# Patient Record
Sex: FEMALE | Race: WHITE
[De-identification: names, ages, dates, MRNs, and addresses within clinical notes are randomized per-mention and may not be internally consistent; named-entity substitution may affect disease eponyms.]

---

## 2017-04-04 ENCOUNTER — APPOINTMENT (OUTPATIENT)
Dept: OBGYN | Facility: CLINIC | Age: 37
End: 2017-04-04

## 2017-04-04 VITALS
DIASTOLIC BLOOD PRESSURE: 60 MMHG | WEIGHT: 134 LBS | SYSTOLIC BLOOD PRESSURE: 89 MMHG | HEIGHT: 66 IN | BODY MASS INDEX: 21.53 KG/M2

## 2017-04-04 DIAGNOSIS — Z82.3 FAMILY HISTORY OF STROKE: ICD-10-CM

## 2017-04-04 DIAGNOSIS — Z82.49 FAMILY HISTORY OF ISCHEMIC HEART DISEASE AND OTHER DISEASES OF THE CIRCULATORY SYSTEM: ICD-10-CM

## 2017-04-04 DIAGNOSIS — Z78.9 OTHER SPECIFIED HEALTH STATUS: ICD-10-CM

## 2017-04-10 LAB
CYTOLOGY CVX/VAG DOC THIN PREP: NORMAL
HPV HIGH+LOW RISK DNA PNL CVX: NEGATIVE

## 2017-04-13 ENCOUNTER — FORM ENCOUNTER (OUTPATIENT)
Age: 37
End: 2017-04-13

## 2017-04-14 ENCOUNTER — APPOINTMENT (OUTPATIENT)
Dept: MAMMOGRAPHY | Facility: CLINIC | Age: 37
End: 2017-04-14

## 2017-04-14 ENCOUNTER — APPOINTMENT (OUTPATIENT)
Dept: ULTRASOUND IMAGING | Facility: CLINIC | Age: 37
End: 2017-04-14

## 2017-04-14 ENCOUNTER — OUTPATIENT (OUTPATIENT)
Dept: OUTPATIENT SERVICES | Facility: HOSPITAL | Age: 37
LOS: 1 days | End: 2017-04-14
Payer: COMMERCIAL

## 2017-04-14 DIAGNOSIS — Z15.01 GENETIC SUSCEPTIBILITY TO MALIGNANT NEOPLASM OF BREAST: ICD-10-CM

## 2017-04-14 DIAGNOSIS — Z00.8 ENCOUNTER FOR OTHER GENERAL EXAMINATION: ICD-10-CM

## 2017-04-14 PROCEDURE — 76856 US EXAM PELVIC COMPLETE: CPT

## 2017-04-14 PROCEDURE — 77063 BREAST TOMOSYNTHESIS BI: CPT

## 2017-04-14 PROCEDURE — 77067 SCR MAMMO BI INCL CAD: CPT

## 2017-04-14 PROCEDURE — 76830 TRANSVAGINAL US NON-OB: CPT

## 2017-10-24 ENCOUNTER — APPOINTMENT (OUTPATIENT)
Dept: OBGYN | Facility: CLINIC | Age: 37
End: 2017-10-24
Payer: COMMERCIAL

## 2017-10-24 VITALS
BODY MASS INDEX: 21.38 KG/M2 | DIASTOLIC BLOOD PRESSURE: 60 MMHG | SYSTOLIC BLOOD PRESSURE: 100 MMHG | WEIGHT: 133 LBS | HEIGHT: 66 IN

## 2017-10-24 PROCEDURE — 99214 OFFICE O/P EST MOD 30 MIN: CPT

## 2017-10-27 LAB
BACTERIA GENITAL AEROBE CULT: ABNORMAL
C TRACH RRNA SPEC QL NAA+PROBE: NOT DETECTED
N GONORRHOEA RRNA SPEC QL NAA+PROBE: NOT DETECTED
SOURCE AMPLIFICATION: NORMAL

## 2017-12-04 ENCOUNTER — APPOINTMENT (OUTPATIENT)
Dept: OBGYN | Facility: CLINIC | Age: 37
End: 2017-12-04
Payer: COMMERCIAL

## 2017-12-04 VITALS
DIASTOLIC BLOOD PRESSURE: 68 MMHG | SYSTOLIC BLOOD PRESSURE: 105 MMHG | BODY MASS INDEX: 21.38 KG/M2 | HEIGHT: 66 IN | WEIGHT: 133 LBS

## 2017-12-04 DIAGNOSIS — N76.0 ACUTE VAGINITIS: ICD-10-CM

## 2017-12-04 PROCEDURE — 99213 OFFICE O/P EST LOW 20 MIN: CPT

## 2017-12-13 ENCOUNTER — APPOINTMENT (OUTPATIENT)
Dept: MATERNAL FETAL MEDICINE | Facility: CLINIC | Age: 37
End: 2017-12-13

## 2017-12-14 ENCOUNTER — APPOINTMENT (OUTPATIENT)
Dept: MATERNAL FETAL MEDICINE | Facility: CLINIC | Age: 37
End: 2017-12-14

## 2018-02-22 ENCOUNTER — APPOINTMENT (OUTPATIENT)
Dept: MATERNAL FETAL MEDICINE | Facility: CLINIC | Age: 38
End: 2018-02-22
Payer: COMMERCIAL

## 2018-02-22 PROCEDURE — 99245 OFF/OP CONSLTJ NEW/EST HI 55: CPT

## 2018-04-09 ENCOUNTER — RESULT REVIEW (OUTPATIENT)
Age: 38
End: 2018-04-09

## 2018-04-16 ENCOUNTER — APPOINTMENT (OUTPATIENT)
Dept: OBGYN | Facility: CLINIC | Age: 38
End: 2018-04-16
Payer: COMMERCIAL

## 2018-04-16 VITALS
HEIGHT: 66 IN | DIASTOLIC BLOOD PRESSURE: 64 MMHG | WEIGHT: 133 LBS | SYSTOLIC BLOOD PRESSURE: 110 MMHG | BODY MASS INDEX: 21.38 KG/M2

## 2018-04-16 PROCEDURE — 99395 PREV VISIT EST AGE 18-39: CPT

## 2018-04-16 PROCEDURE — 99213 OFFICE O/P EST LOW 20 MIN: CPT | Mod: 25

## 2018-04-18 LAB
C TRACH RRNA SPEC QL NAA+PROBE: NOT DETECTED
HPV HIGH+LOW RISK DNA PNL CVX: NOT DETECTED
N GONORRHOEA RRNA SPEC QL NAA+PROBE: NOT DETECTED
SOURCE TP AMPLIFICATION: NORMAL

## 2018-04-23 LAB — CYTOLOGY CVX/VAG DOC THIN PREP: NORMAL

## 2018-10-24 ENCOUNTER — RESULT CHARGE (OUTPATIENT)
Age: 38
End: 2018-10-24

## 2018-10-24 ENCOUNTER — APPOINTMENT (OUTPATIENT)
Dept: OBGYN | Facility: CLINIC | Age: 38
End: 2018-10-24
Payer: COMMERCIAL

## 2018-10-24 ENCOUNTER — ASOB RESULT (OUTPATIENT)
Age: 38
End: 2018-10-24

## 2018-10-24 VITALS
DIASTOLIC BLOOD PRESSURE: 75 MMHG | HEART RATE: 81 BPM | BODY MASS INDEX: 21.53 KG/M2 | HEIGHT: 66 IN | WEIGHT: 134 LBS | SYSTOLIC BLOOD PRESSURE: 117 MMHG

## 2018-10-24 DIAGNOSIS — N92.0 EXCESSIVE AND FREQUENT MENSTRUATION WITH REGULAR CYCLE: ICD-10-CM

## 2018-10-24 DIAGNOSIS — Z80.41 FAMILY HISTORY OF MALIGNANT NEOPLASM OF OVARY: ICD-10-CM

## 2018-10-24 DIAGNOSIS — Z80.3 FAMILY HISTORY OF MALIGNANT NEOPLASM OF BREAST: ICD-10-CM

## 2018-10-24 LAB
HCG UR QL: NEGATIVE
QUALITY CONTROL: YES

## 2018-10-24 PROCEDURE — 81025 URINE PREGNANCY TEST: CPT

## 2018-10-24 PROCEDURE — 76830 TRANSVAGINAL US NON-OB: CPT

## 2018-10-24 PROCEDURE — 76857 US EXAM PELVIC LIMITED: CPT

## 2018-10-24 PROCEDURE — 58558Z: CUSTOM

## 2018-10-27 LAB — CORE LAB BIOPSY: NORMAL

## 2018-11-13 LAB
BASOPHILS # BLD AUTO: 0.04 K/UL
BASOPHILS NFR BLD AUTO: 0.5 %
CANCER AG125 SERPL-ACNC: 23 U/ML
EOSINOPHIL # BLD AUTO: 0.07 K/UL
EOSINOPHIL NFR BLD AUTO: 0.8 %
HCT VFR BLD CALC: 42.8 %
HGB BLD-MCNC: 15.1 G/DL
IMM GRANULOCYTES NFR BLD AUTO: 0.4 %
LYMPHOCYTES # BLD AUTO: 2.45 K/UL
LYMPHOCYTES NFR BLD AUTO: 29.7 %
MAN DIFF?: NORMAL
MCHC RBC-ENTMCNC: 32.9 PG
MCHC RBC-ENTMCNC: 35.3 GM/DL
MCV RBC AUTO: 93.2 FL
MONOCYTES # BLD AUTO: 0.48 K/UL
MONOCYTES NFR BLD AUTO: 5.8 %
NEUTROPHILS # BLD AUTO: 5.18 K/UL
NEUTROPHILS NFR BLD AUTO: 62.8 %
PLATELET # BLD AUTO: 314 K/UL
RBC # BLD: 4.59 M/UL
RBC # FLD: 12.7 %
WBC # FLD AUTO: 8.25 K/UL

## 2019-04-29 ENCOUNTER — APPOINTMENT (OUTPATIENT)
Dept: OBGYN | Facility: CLINIC | Age: 39
End: 2019-04-29
Payer: COMMERCIAL

## 2019-04-29 VITALS
BODY MASS INDEX: 21.53 KG/M2 | HEART RATE: 62 BPM | HEIGHT: 66 IN | SYSTOLIC BLOOD PRESSURE: 115 MMHG | WEIGHT: 134 LBS | DIASTOLIC BLOOD PRESSURE: 77 MMHG

## 2019-04-29 DIAGNOSIS — Z01.419 ENCOUNTER FOR GYNECOLOGICAL EXAMINATION (GENERAL) (ROUTINE) W/OUT ABNORMAL FINDINGS: ICD-10-CM

## 2019-04-29 PROCEDURE — 99395 PREV VISIT EST AGE 18-39: CPT

## 2019-04-29 PROCEDURE — 81003 URINALYSIS AUTO W/O SCOPE: CPT | Mod: QW

## 2019-04-29 NOTE — PHYSICAL EXAM
[Awake] : awake [Alert] : alert [Acute Distress] : no acute distress [LAD] : no lymphadenopathy [Thyroid Nodule] : no thyroid nodule [Goiter] : no goiter [Mass] : no breast mass [Nipple Discharge] : no nipple discharge [Axillary LAD] : no axillary lymphadenopathy [Soft] : soft [Tender] : non tender [Distended] : not distended [H/Smegaly] : no hepatosplenomegaly [Oriented x3] : oriented to person, place, and time [Normal] : uterus [No Bleeding] : there was no active vaginal bleeding [Uterine Adnexae] : were not tender and not enlarged

## 2019-05-01 LAB
C TRACH RRNA SPEC QL NAA+PROBE: NOT DETECTED
N GONORRHOEA RRNA SPEC QL NAA+PROBE: NOT DETECTED
SOURCE AMPLIFICATION: NORMAL

## 2019-05-02 LAB
CYTOLOGY CVX/VAG DOC THIN PREP: NORMAL
HPV HIGH+LOW RISK DNA PNL CVX: DETECTED

## 2019-05-28 ENCOUNTER — APPOINTMENT (OUTPATIENT)
Dept: OBGYN | Facility: CLINIC | Age: 39
End: 2019-05-28
Payer: COMMERCIAL

## 2019-05-28 ENCOUNTER — ASOB RESULT (OUTPATIENT)
Age: 39
End: 2019-05-28

## 2019-05-28 VITALS
BODY MASS INDEX: 21.53 KG/M2 | SYSTOLIC BLOOD PRESSURE: 120 MMHG | DIASTOLIC BLOOD PRESSURE: 60 MMHG | HEIGHT: 66 IN | WEIGHT: 134 LBS

## 2019-05-28 DIAGNOSIS — B97.7 PAPILLOMAVIRUS AS THE CAUSE OF DISEASES CLASSIFIED ELSEWHERE: ICD-10-CM

## 2019-05-28 DIAGNOSIS — N63.0 UNSPECIFIED LUMP IN UNSPECIFIED BREAST: ICD-10-CM

## 2019-05-28 DIAGNOSIS — N64.4 MASTODYNIA: ICD-10-CM

## 2019-05-28 PROCEDURE — 76857 US EXAM PELVIC LIMITED: CPT

## 2019-05-28 PROCEDURE — 99214 OFFICE O/P EST MOD 30 MIN: CPT

## 2019-05-28 PROCEDURE — 76830 TRANSVAGINAL US NON-OB: CPT

## 2019-08-28 ENCOUNTER — LABORATORY RESULT (OUTPATIENT)
Age: 39
End: 2019-08-28

## 2019-10-25 ENCOUNTER — APPOINTMENT (OUTPATIENT)
Dept: SURGERY | Facility: CLINIC | Age: 39
End: 2019-10-25
Payer: COMMERCIAL

## 2019-10-25 VITALS
HEIGHT: 66 IN | SYSTOLIC BLOOD PRESSURE: 120 MMHG | BODY MASS INDEX: 21.86 KG/M2 | DIASTOLIC BLOOD PRESSURE: 80 MMHG | WEIGHT: 136 LBS

## 2019-10-25 DIAGNOSIS — Z86.000 PERSONAL HISTORY OF IN-SITU NEOPLASM OF BREAST: ICD-10-CM

## 2019-10-25 DIAGNOSIS — R92.1 MAMMOGRAPHIC CALCIFICATION FOUND ON DIAGNOSTIC IMAGING OF BREAST: ICD-10-CM

## 2019-10-25 PROCEDURE — 99205 OFFICE O/P NEW HI 60 MIN: CPT

## 2019-10-28 ENCOUNTER — APPOINTMENT (OUTPATIENT)
Dept: PLASTIC SURGERY | Facility: CLINIC | Age: 39
End: 2019-10-28
Payer: COMMERCIAL

## 2019-10-28 VITALS — WEIGHT: 136 LBS | BODY MASS INDEX: 21.86 KG/M2 | HEIGHT: 66 IN

## 2019-10-28 DIAGNOSIS — Z63.5 DISRUPTION OF FAMILY BY SEPARATION AND DIVORCE: ICD-10-CM

## 2019-10-28 DIAGNOSIS — G47.419 NARCOLEPSY W/OUT CATAPLEXY: ICD-10-CM

## 2019-10-28 PROCEDURE — 99205 OFFICE O/P NEW HI 60 MIN: CPT

## 2019-10-28 SDOH — SOCIAL STABILITY - SOCIAL INSECURITY: DISRUPTION OF FAMILY BY SEPARATION AND DIVORCE: Z63.5

## 2019-10-31 PROBLEM — Z63.5 SEPARATED FROM SPOUSE: Status: ACTIVE | Noted: 2019-10-31

## 2019-10-31 PROBLEM — G47.419 NARCOLEPSY: Status: RESOLVED | Noted: 2019-10-31 | Resolved: 2019-10-31

## 2019-10-31 NOTE — HISTORY OF PRESENT ILLNESS
[FreeTextEntry1] : 40 yo female presents for a breast reconstruction consultation.  The patient is BRCA2 positive and has been undergoing breast imaging for surveillance every 6 months.  She recently underwent imaging and then biopsy of her left breast revealing DCIS.  \par \par The patient has history of breast augmentation in 2016, shes believes the implants are subpectoral, placed via an IMF approach, but is unsure of other implant details.  \par \par She will be undergoing bilateral mastectomy, possibly nipple-sparing, and would like to discuss breast reconstruction options. She would like to be the same size or painting. \par \par The patient states she was recently diagnosed with narcolepsy and takes armodafinil.  She has additional surgical history of , abdominoplasty, and rhinoplasty.  She has three children, ages 7, 5 and 5.  The patient does not smoke.  She is .  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

## 2019-10-31 NOTE — PHYSICAL EXAM
[Bra Size: _______] : Bra Size: [unfilled] [NI] : Normal [de-identified] : sternal notch to nipple on the left is 22.5 cm and 21 cm on the right, the nipple to IMF is 10.5 cm on the left and 9.5 cm on the right, the base width is 11 cm, there is grade 1 ptosis.  Bilateral 4 cm IMF scars.  Left NAC is lower than the right.  the patient has breast asymmetry. there is no open wound, erythema, or mass [de-identified] : Abdominoplasty scar [de-identified] : inadequate adiposity or skin laxity for the breast reconstruction volume the patient desires

## 2019-11-03 ENCOUNTER — FORM ENCOUNTER (OUTPATIENT)
Age: 39
End: 2019-11-03

## 2019-11-04 ENCOUNTER — APPOINTMENT (OUTPATIENT)
Dept: MRI IMAGING | Facility: CLINIC | Age: 39
End: 2019-11-04
Payer: COMMERCIAL

## 2019-11-04 ENCOUNTER — OUTPATIENT (OUTPATIENT)
Dept: OUTPATIENT SERVICES | Facility: HOSPITAL | Age: 39
LOS: 1 days | End: 2019-11-04
Payer: COMMERCIAL

## 2019-11-04 DIAGNOSIS — D05.12 INTRADUCTAL CARCINOMA IN SITU OF LEFT BREAST: ICD-10-CM

## 2019-11-04 PROCEDURE — C8937: CPT

## 2019-11-04 PROCEDURE — C8908: CPT

## 2019-11-04 PROCEDURE — 77049 MRI BREAST C-+ W/CAD BI: CPT | Mod: 26

## 2019-11-04 PROCEDURE — A9585: CPT

## 2019-11-06 ENCOUNTER — APPOINTMENT (OUTPATIENT)
Dept: BREAST CENTER | Facility: CLINIC | Age: 39
End: 2019-11-06
Payer: COMMERCIAL

## 2019-11-06 VITALS
WEIGHT: 135 LBS | HEART RATE: 59 BPM | HEIGHT: 66 IN | SYSTOLIC BLOOD PRESSURE: 105 MMHG | DIASTOLIC BLOOD PRESSURE: 63 MMHG | BODY MASS INDEX: 21.69 KG/M2

## 2019-11-06 DIAGNOSIS — Z13.79 ENCOUNTER FOR OTHER SCREENING FOR GENETIC AND CHROMOSOMAL ANOMALIES: ICD-10-CM

## 2019-11-06 DIAGNOSIS — Z80.0 FAMILY HISTORY OF MALIGNANT NEOPLASM OF DIGESTIVE ORGANS: ICD-10-CM

## 2019-11-06 DIAGNOSIS — Z82.49 FAMILY HISTORY OF ISCHEMIC HEART DISEASE AND OTHER DISEASES OF THE CIRCULATORY SYSTEM: ICD-10-CM

## 2019-11-06 DIAGNOSIS — Z84.81 FAMILY HISTORY OF CARRIER OF GENETIC DISEASE: ICD-10-CM

## 2019-11-06 DIAGNOSIS — Z80.3 FAMILY HISTORY OF MALIGNANT NEOPLASM OF BREAST: ICD-10-CM

## 2019-11-06 PROCEDURE — 99245 OFF/OP CONSLTJ NEW/EST HI 55: CPT

## 2019-11-06 RX ORDER — NORETHINDRONE ACETATE AND ETHINYL ESTRADIOL TABLETS AND FERROUS FUMARATE TABLETS 1MG-20(24)
1-20 KIT ORAL
Qty: 90 | Refills: 3 | Status: DISCONTINUED | COMMUNITY
Start: 2017-10-24 | End: 2019-11-06

## 2019-11-06 RX ORDER — AMOXICILLIN 500 MG/1
500 TABLET, FILM COATED ORAL 3 TIMES DAILY
Qty: 21 | Refills: 0 | Status: DISCONTINUED | COMMUNITY
Start: 2017-10-27 | End: 2019-11-06

## 2019-11-06 RX ORDER — MISOPROSTOL 200 UG/1
200 TABLET ORAL
Qty: 2 | Refills: 0 | Status: DISCONTINUED | COMMUNITY
Start: 2018-04-16 | End: 2019-11-06

## 2019-11-06 RX ORDER — CLOTRIMAZOLE AND BETAMETHASONE DIPROPIONATE 10; .5 MG/G; MG/G
1-0.05 CREAM TOPICAL TWICE DAILY
Qty: 1 | Refills: 2 | Status: DISCONTINUED | COMMUNITY
Start: 2017-12-04 | End: 2019-11-06

## 2019-11-06 RX ORDER — FLUCONAZOLE 150 MG/1
150 TABLET ORAL
Qty: 1 | Refills: 2 | Status: DISCONTINUED | COMMUNITY
Start: 2017-10-27 | End: 2019-11-06

## 2019-11-06 NOTE — DATA REVIEWED
[FreeTextEntry1] : Bilateral mammogram 7/2/2019:  Bilateral intact implants.  Small grouping of fine microcalcifications upper outer left breast.  Additional views advised.\par \par Bilateral breast ultrasound 7/30/2019:  Negative.\par \par Left mammogram 7/30/2019:  Grouping of pleomorphic microcalcifications upper outer left breast.  Consider biopsy.\par \par Left stereotactic biopsy 10/10/2019:  Ductal carcinoma in situ, solid with comedonecrosis and calcifications, intermediate grade, ER %  HI 71-80%\par \par Bilateral breast MRI 11/4/2019:  Biopsy marker left UOQ with minimal NME.  Intact implants.  Left duct ectasia with internal debris/no enhancement.\par \par (Images were reviewed on PACS.)\par

## 2019-11-06 NOTE — REVIEW OF SYSTEMS
[Negative] : Endocrine [de-identified] : had evaluation by Dr. Chang in 2018 for family clotting history, hypercoagulable work-up negative

## 2019-11-06 NOTE — PHYSICAL EXAM
[EOMI] : extra ocular movement intact [Sclera nonicteric] : sclera nonicteric [Supple] : supple [No Supraclavicular Adenopathy] : no supraclavicular adenopathy [No Cervical Adenopathy] : no cervical adenopathy [No Thyromegaly] : no thyromegaly [Clear to Auscultation Bilat] : clear to auscultation bilaterally [Normal Sinus Rhythm] : normal sinus rhythm [Normal S1, S2] : normal S1 and S2 [Examined in the supine and seated position] : examined in the supine and seated position [Symmetrical] : symmetrical [Bra Size: ___] : Bra Size: [unfilled] [No dominant masses] : no dominant masses in right breast  [No dominant masses] : no dominant masses left breast [No Nipple Retraction] : no left nipple retraction [No Nipple Discharge] : no left nipple discharge [No Axillary Lymphadenopathy] : no left axillary lymphadenopathy [Soft] : abdomen soft [Not Tender] : non-tender [No Palpable Masses] : no abdominal mass palpated [de-identified] : Inframammary incision. [de-identified] : Inframammary incision.

## 2019-11-06 NOTE — HISTORY OF PRESENT ILLNESS
[FreeTextEntry1] : This is a 39 year old  female who has a known BRCA2 mutation.  She was tested (single site test and ?later additional test with Dr. Tate) after her father was diagnosed with pancreatic cancer and found to carry the mutation.\par \par She has been having breast screening and her recent mammogram showed new calcifications in the left breast.  A stereotactic biopsy shows DCIS. She has silicone breast implants that were placed by Dr. Cortez in 2016.  She does BSE and thought she felt something in the outer areas of both breasts before going for the mammogram. She is her with her paternal aunt.\par \par She has already met with Dr. Esquivel and Dr. Aranda and is here for another opinion. Additional genetic testing was sent by Dr. Esquivel.

## 2019-11-06 NOTE — PAST MEDICAL HISTORY
[Menarche Age ____] : age at menarche was [unfilled] [Total Preg ___] : G[unfilled] [Live Births ___] : P[unfilled]  [AB Spont ___] : miscarriages: [unfilled]  [Multiple Births ___] :  multiple birth pregnancies: [unfilled] [FreeTextEntry2] : 3 boys [FreeTextEntry7] : ages 16-18 [FreeTextEntry8] : 1 year each

## 2019-11-08 ENCOUNTER — RESULT REVIEW (OUTPATIENT)
Age: 39
End: 2019-11-08

## 2019-11-11 ENCOUNTER — APPOINTMENT (OUTPATIENT)
Dept: SURGERY | Facility: CLINIC | Age: 39
End: 2019-11-11

## 2019-11-11 ENCOUNTER — APPOINTMENT (OUTPATIENT)
Dept: SURGERY | Facility: CLINIC | Age: 39
End: 2019-11-11
Payer: COMMERCIAL

## 2019-11-17 PROBLEM — Z86.000 HISTORY OF DUCTAL CARCINOMA IN SITU (DCIS) OF LEFT BREAST: Status: RESOLVED | Noted: 2019-10-25 | Resolved: 2019-11-17

## 2019-11-17 NOTE — PHYSICAL EXAM
[Normocephalic] : normocephalic [EOMI] : extra ocular movement intact [Atraumatic] : atraumatic [Sclera nonicteric] : sclera nonicteric [PERRL] : pupils equal, round and reactive to light [Supple] : supple [No Supraclavicular Adenopathy] : no supraclavicular adenopathy [Examined in the supine and seated position] : examined in the supine and seated position [No dominant masses] : no dominant masses in right breast  [No dominant masses] : no dominant masses left breast [No Nipple Retraction] : no left nipple retraction [No Nipple Discharge] : no left nipple discharge [No Axillary Lymphadenopathy] : no right axillary lymphadenopathy [No Rashes] : no rashes [No Ulceration] : no ulceration [No Edema] : no edema [Breast Nipple Inversion] : nipples not inverted [Breast Nipple Flattening] : nipples not flattened [Breast Nipple Retraction] : nipples not retracted [Breast Nipple Fissures] : nipples not fissured [Breast Abnormal Secretion Bloody Fluid] : no bloody discharge [Breast Abnormal Lactation (Galactorrhea)] : no galactorrhea [Breast Abnormal Secretion Opalescent Fluid] : no milky discharge [de-identified] : No supraclavicular or axillary adenopathy. No dominant masses, normal to palpation. Everted nipple without discharge.  [Breast Abnormal Secretion Serous Fluid] : no serous discharge [de-identified] : No supraclavicular or axillary adenopathy. No dominant masses, normal to palpation. Everted nipple without discharge. (upper outer quadrant biopsy site)

## 2019-11-17 NOTE — HISTORY OF PRESENT ILLNESS
[FreeTextEntry1] : I had the pleasure of seeing Mackenzie Qiu in the office for a new visit breast evaluation secondary to newly diagnosed left breast DCIS. \par \par Macknezie is a darrick 38 yo premenopausal female who carries a BRCA 2 genetic mutation and has been under high risk surveillance with alternating MRI and mammogram q 6 months.  She was noted to have an area of pleomorphic microcalcifications on left mammogram 7/2019.  Stereotactic core biopsy demonstrated DCIS grade 2 with comedonecrosis ER % NE 90% \par \par We reviewed imaging and pathogenesis of disease.  She understands that DCIS is stage 0 breast cancer and that the treatment options include hormonal therapy, radiation therapy with breast conservation v mastectomy without hormonal therapy or radiation therapy.  She understands that secondary to history of BRCA 2 carrier, bilateral risk reduction mastectomy is recommended in addition to bilateral salpingo-oophorectomy.  She understands that recent data suggests bilateral salpingo-oophorectomy can be performed in several years, however she will discuss this at length with Dr. Lee Malagon who she was referred to.  In addition, we discussed fertility preservation, however she states that she is done with family planning.\par \par We discussed risks v benefits v technical aspects of surgery.  We discussed the options of skin sparing, nipple sparing v simple mastectomy and the technical variations with increased risk for skin ischemia/tissue loss with nipple sparing mastectomy.   We thoroughly discussed sentinel lymph node biopsy, the rate of lymphedema of 7-10% and the need for blue dye and nuclear medicine radiotracer.  She understands that data does not support performing sentinel lymph node biopsy on the contralateral side, however we discussed risks v benefits and the option of performing it at the time of risk reduction right mastectomy in the event of occult malignancy, axillary dissection would be avoided. She understands that the rate of lymphedema with axillary dissection can be as high as 20-25%.  We also discussed the option of breast conservation however secondary to genetic mutation in BRCA 2 she understands that she has an elevated risk of developing a secondary breast cancer and standard recommendation is for bilateral risk reduction mastectomy.\par \par We also discussed the various cancers associated with BRCA 2 and elevated risk including melanoma and pancreatic cancer.  We will coordinate a consultation with a dermatologist after surgery for annual screening.\par \par All questions were answered.\par A consultation with Plastic Surgery will be coordinated.\par \par

## 2019-11-17 NOTE — ASSESSMENT
[FreeTextEntry1] : 40 yo premenopausal female with BRCA 2 genetic mutation presents with biopsy proven hormone positive ductal carcinoma in situ; hormone positive.  She is electing to undergo bilateral mastectomy with left sentinel lymph node biopsy. \par 1. MRI of the breast\par 2. Repeat or request genetic testing\par 3. Plan for bilateral nipple sparing mastectomy\par 4. Consult with Plastic Surgery\par 5. Appt with Dr. Redd for fertility options\par 6. Appt with Dr. Lee Malagon

## 2019-11-19 ENCOUNTER — APPOINTMENT (OUTPATIENT)
Dept: SURGERY | Facility: CLINIC | Age: 39
End: 2019-11-19
Payer: COMMERCIAL

## 2019-11-19 DIAGNOSIS — Z01.818 ENCOUNTER FOR OTHER PREPROCEDURAL EXAMINATION: ICD-10-CM

## 2019-11-19 PROCEDURE — 99215 OFFICE O/P EST HI 40 MIN: CPT

## 2019-11-20 ENCOUNTER — APPOINTMENT (OUTPATIENT)
Dept: GYNECOLOGIC ONCOLOGY | Facility: CLINIC | Age: 39
End: 2019-11-20
Payer: COMMERCIAL

## 2019-11-20 DIAGNOSIS — O24.419 GESTATIONAL DIABETES MELLITUS IN PREGNANCY, UNSPECIFIED CONTROL: ICD-10-CM

## 2019-11-20 DIAGNOSIS — Z91.89 OTHER SPECIFIED PERSONAL RISK FACTORS, NOT ELSEWHERE CLASSIFIED: ICD-10-CM

## 2019-11-20 PROBLEM — Z01.818 PREOPERATIVE EXAMINATION: Status: ACTIVE | Noted: 2019-11-20

## 2019-11-20 PROCEDURE — 99245 OFF/OP CONSLTJ NEW/EST HI 55: CPT

## 2019-11-20 NOTE — OB HISTORY
[Total Preg ___] : : [unfilled] [Full Term ___] : [unfilled] (full-term) [Living ___] : [unfilled] (living) [Vaginal ___] : [unfilled] vaginal delivery(s) [ ___] : [unfilled]  section delivery(s)

## 2019-11-20 NOTE — HISTORY OF PRESENT ILLNESS
[FreeTextEntry1] : I had the pleasure of seeing Mackenzie Qiu in the office for follow up breast evaluation secondary to newly diagnosed left breast DCIS. \par \par Mackenzie is a darrick 38 yo premenopausal female who carries a BRCA 2 genetic mutation and has been under high risk surveillance with alternating MRI and mammogram q 6 months. She was noted to have an area of pleomorphic microcalcifications on left mammogram 7/2019. Stereotactic core biopsy demonstrated DCIS grade 2 with comedonecrosis ER % HI 90% \par \par We reviewed imaging and pathogenesis of disease. She understands that DCIS is stage 0 breast cancer and that the treatment options include hormonal therapy, radiation therapy with breast conservation v mastectomy without hormonal therapy or radiation therapy. She understands that secondary to history of BRCA 2 carrier, bilateral risk reduction mastectomy is recommended in addition to bilateral salpingo-oophorectomy. She understands that recent data suggests bilateral salpingo-oophorectomy can be performed in several years, however she will discuss this at length with Dr. Lee Malagon who she was referred to. In addition, we discussed fertility preservation, however she states that she is done with family planning.\par \par We discussed risks v benefits v technical aspects of surgery. We discussed the options of skin sparing, nipple sparing v simple mastectomy and the technical variations with increased risk for skin ischemia/tissue loss with nipple sparing mastectomy. We thoroughly discussed sentinel lymph node biopsy, the rate of lymphedema of 7-10% and the need for blue dye and nuclear medicine radiotracer. She understands that data does not support performing sentinel lymph node biopsy on the contralateral side, however we discussed risks v benefits and the option of performing it at the time of risk reduction right mastectomy in the event of occult malignancy, axillary dissection would be avoided. She understands that the rate of lymphedema with axillary dissection can be as high as 20-25%. We also discussed the option of breast conservation however secondary to genetic mutation in BRCA 2 she understands that she has an elevated risk of developing a secondary breast cancer and standard recommendation is for bilateral risk reduction mastectomy.\par \par We also discussed the various cancers associated with BRCA 2 and elevated risk including melanoma and pancreatic cancer. We will coordinate a consultation with a dermatologist after surgery for annual screening.\par \par \par Since her last visit she had MRI done and met with plastics surgeon Dr cooper. \par All questions were answered.\par \par \par MRI breast: 11/4/19 recently diagnosed left breast DCIS, no other suspicious masses, intact bilateral silicone implants. BiRads 6- biopsy proven malignancy

## 2019-11-20 NOTE — ASSESSMENT
[FreeTextEntry1] : 40 yo premenopausal female with BRCA 2 genetic mutation presents with biopsy proven hormone positive ductal carcinoma in situ; hormone positive. She is electing to undergo bilateral mastectomy with left sentinel lymph node biopsy. \par 1. MRI of the breast reviewed with patient\par 2. Plan for bilateral nipple sparing mastectomy with left SLNB and plastics reconstruction \par 4. Consult with Plastic Surgery done, Dr Aranda's recomenndations appreciated \par 5. Appt with Dr. Redd for fertility options\par 6. Appt with Dr. Lee Malagon. \par

## 2019-11-20 NOTE — PHYSICAL EXAM
[Normocephalic] : normocephalic [Atraumatic] : atraumatic [Supple] : supple [No Supraclavicular Adenopathy] : no supraclavicular adenopathy [Examined in the supine and seated position] : examined in the supine and seated position [No dominant masses] : no dominant masses in right breast  [No dominant masses] : no dominant masses left breast [No Nipple Retraction] : no left nipple retraction [No Nipple Discharge] : no right nipple discharge [No Axillary Lymphadenopathy] : no left axillary lymphadenopathy [No Rashes] : no rashes [No Edema] : no edema [No Ulceration] : no ulceration [EOMI] : extra ocular movement intact [PERRL] : pupils equal, round and reactive to light [Sclera nonicteric] : sclera nonicteric [Breast Nipple Retraction] : nipples not retracted [Breast Nipple Inversion] : nipples not inverted [Breast Nipple Flattening] : nipples not flattened [Breast Nipple Fissures] : nipples not fissured [Breast Abnormal Lactation (Galactorrhea)] : no galactorrhea [Breast Abnormal Secretion Serous Fluid] : no serous discharge [Breast Abnormal Secretion Opalescent Fluid] : no milky discharge [Breast Abnormal Secretion Bloody Fluid] : no bloody discharge [de-identified] : No supraclavicular or axillary adenopathy. No dominant masses, normal to palpation. Everted nipple without discharge. (upper outer quadrant biopsy site) [de-identified] : No supraclavicular or axillary adenopathy. No dominant masses, normal to palpation. Everted nipple without discharge.

## 2019-11-26 NOTE — CHIEF COMPLAINT
[FreeTextEntry1] : [place] Office\par \par [facility] [phone] at [address] [place] [address]

## 2019-11-26 NOTE — END OF VISIT
[FreeTextEntry3] : This note accurately reflects the work and decisions made by me. \par Written by Sheila Lagunas PA-C acting as a scribe for Dr. Clinton Malagon.

## 2019-11-26 NOTE — PHYSICAL EXAM
[Normal] : Bimanual Exam: Normal [de-identified] : Patient was interviewed and examined with chaperone present. Name of Chaperone: Sheila Lagunas PA-C

## 2019-11-26 NOTE — ASSESSMENT
[FreeTextEntry1] : Discussed in detail with patient her multiple options including minimally Bilateral salpingo-oophorectomy, I discussed with the patient that I do not recommend this as there is a possibility during the procedure that part of the fallopian tube, where we assume ovarian cancer begins, would still be present and there is a risk although small that she could develop a future ovarian cancer. I also discussed that breast cancers are responsive to hormone therapy and it is better to have her uterus removed as we can give estrogen therapy. If her uterus is in place, we have to give both progesterone and estrogen. I discussed that in her situation, if she no longer desires childbearing her best option would be a complete hysterectomy, BSO. I discussed that given her marital situation and her current separation she should have long discussion and decision making about future fertility, as it is not a desire now with future partners it may be. I also discussed with the patient that I would prefer for the case to be done at [facility] vs. [facility] as my team is present there. I also recommended that the hysterectomy take place during the initial procedure, not the breast reimplantation procedure.

## 2019-11-26 NOTE — HISTORY OF PRESENT ILLNESS
[FreeTextEntry1] : This 38 y/o  female, LMP 19 being referred by Dr. Esquivel for new diagnosis of DCIS and BRCA 2 mutation. She had been under high risk surveillance with alternating MRI and mammogram q 6 months. She was noted to have an area of pleomorphic microcalcifications on left mammogram 2019. Stereotactic core biopsy demonstrated DCIS grade 2 with comedonecrosis ER % [place] 90%. She is planned for bilateral nipple sparing mastectomy with SLNB and plastics reconstruction with Dr. Aranda and Dr. Esquivel. Dr. Aranda explained options of 2 stage tissue expander-implant reconstruction and direct to implant reconstruction.  She was recommended for a consultation with Dr. Redd for fertility options which she has not yet seen, but she reports if it is best for her health she would proceed with recommended procedures as she already has children. Of note patient is scheduled for first procedure in January at [facility]. \par \par PAP - 2019; HPV positive\par Colonoscopy - Patient has never had, she is currently following with a GI doctor

## 2020-02-28 ENCOUNTER — APPOINTMENT (OUTPATIENT)
Dept: GYNECOLOGIC ONCOLOGY | Facility: CLINIC | Age: 40
End: 2020-02-28
Payer: COMMERCIAL

## 2020-02-28 PROCEDURE — 99214 OFFICE O/P EST MOD 30 MIN: CPT | Mod: 25

## 2020-02-28 PROCEDURE — 76857 US EXAM PELVIC LIMITED: CPT | Mod: 59

## 2020-02-28 PROCEDURE — 76830 TRANSVAGINAL US NON-OB: CPT | Mod: 59

## 2020-03-18 NOTE — END OF VISIT
[FreeTextEntry3] : Written by Sofiya Jordan, acting as a scribe for Dr. Clinton Malagon.\par This note accurately reflects the work and decisions made by me\par

## 2020-03-18 NOTE — HISTORY OF PRESENT ILLNESS
[FreeTextEntry1] : 38 y/o with newly diagnosed DCIS grade 2 with comedonecrosis ER % [place] 90% and BRCA 2 mutation referred by Dr. Esquivel. She was seen on 11/20/19 for a consultation. We discussed in detail multiple options including minimally bilateral salpingo-oophorectomy which I am in least favor of vs a complete hysterectomy with BSO which I am recommending. Patient presents today to re-discuss her options and finalize a treatment plan. Patient is  from her  and is unsure if she desires future fertility. She reports having three children and although probably unlikely to have any more is unsure if she wants to give up that option. She is scheduled on 4/11/20 for breast reconstruction.

## 2020-03-18 NOTE — REASON FOR VISIT
[FreeTextEntry1] : [facility]\par \par [facility] [phone] at [address] [place] [phone]\par

## 2020-03-18 NOTE — PHYSICAL EXAM
[Normal] : No focal neurologic defects observed [de-identified] : Sofiya Jordan MA was present the entire time of discussion

## 2020-03-18 NOTE — ASSESSMENT
[FreeTextEntry1] : Discussed with patient that if she is still unsure if she desires future fertility I am recommending a consultation with RUT to evaluate if she is a candidate to free eggs before finalizing any gynecological procedures. We discussed that after she has a consultation with RUT she should return to discuss surgical options. I explained that by having a hysterectomy with BSO allows for easier management of menopausal symptoms since her uterus will be removed. I performed an US today as follow up which was normal. I recommend starting a baby aspirin and possibly starting OCP to help suppress ovulation and possible help prevent ovarian cancer. Patient understands my recommendation and will consult with RUT and return after her reconstruction procedure is complete to finalize a surgical plan.

## 2020-04-06 ENCOUNTER — APPOINTMENT (OUTPATIENT)
Dept: HUMAN REPRODUCTION | Facility: CLINIC | Age: 40
End: 2020-04-06

## 2020-05-29 ENCOUNTER — APPOINTMENT (OUTPATIENT)
Dept: GYNECOLOGIC ONCOLOGY | Facility: CLINIC | Age: 40
End: 2020-05-29
Payer: COMMERCIAL

## 2020-05-29 VITALS — WEIGHT: 135 LBS | BODY MASS INDEX: 21.69 KG/M2 | HEIGHT: 66 IN

## 2020-05-29 PROCEDURE — 99214 OFFICE O/P EST MOD 30 MIN: CPT

## 2020-06-04 NOTE — ASSESSMENT
[FreeTextEntry1] : I discussed at length with the patient the nature, purpose, risks, benefits, and alternatives of laparoscopic bilateral salpingo-oophorectomy.  The patient understands the risks to include (but not be limited to) bowel injury, bleeding (with the possible need for transfusion), bladder or ureteral injury, infections, deep venous thrombosis, and mitul-operative death.  The patient also understands that her surgery may not be able to be performed laparoscopically and that she may need a laparotomy.  She also understands the limitations of laparoscopic surgery and the possibility of missing a surgical complication with need for subsequent re-exploration.  She agrees to proceed.  She asked numerous questions which were answered to her satisfaction.  She understands the need for a pre-operative bowel preparation and agrees to comply with our instructions.  She also understands the rationale for surgical staging should a malignancy be encountered and understands that that may require a larger surgery and even, possibly, a laparotomy.\par \par

## 2020-06-04 NOTE — HISTORY OF PRESENT ILLNESS
[FreeTextEntry1] : 41 y/o  with newly diagnosed DCIS grade 2 with comedonecrosis ER % [place] 90% and BRCA 2 mutation referred by Dr. Esquivel. She was seen on 11/20/19 for a consultation.  We discussed the option for a RA TLH BSO vs lap BSO. Patient is  with three children. She was unsure is she wanted to preserve future fertility. She met with an RUT and is planned to begin egg retrieval with her next menstrual cycle.

## 2020-06-04 NOTE — REASON FOR VISIT
[FreeTextEntry1] : [facility]\par \par [facility] [phone] at [address] [place] [phone]\par

## 2020-06-04 NOTE — PHYSICAL EXAM
[Normal] : No focal neurologic defects observed [de-identified] : Sofiya Jordan MA was present the entire time of discussion

## 2020-08-03 ENCOUNTER — OUTPATIENT (OUTPATIENT)
Dept: OUTPATIENT SERVICES | Facility: HOSPITAL | Age: 40
LOS: 1 days | End: 2020-08-03
Payer: COMMERCIAL

## 2020-08-03 VITALS
DIASTOLIC BLOOD PRESSURE: 59 MMHG | SYSTOLIC BLOOD PRESSURE: 103 MMHG | WEIGHT: 136.91 LBS | HEIGHT: 66 IN | TEMPERATURE: 98 F | OXYGEN SATURATION: 100 % | RESPIRATION RATE: 18 BRPM | HEART RATE: 63 BPM

## 2020-08-03 DIAGNOSIS — Z01.818 ENCOUNTER FOR OTHER PREPROCEDURAL EXAMINATION: ICD-10-CM

## 2020-08-03 DIAGNOSIS — Z90.13 ACQUIRED ABSENCE OF BILATERAL BREASTS AND NIPPLES: Chronic | ICD-10-CM

## 2020-08-03 DIAGNOSIS — Z15.09 GENETIC SUSCEPTIBILITY TO OTHER MALIGNANT NEOPLASM: ICD-10-CM

## 2020-08-03 DIAGNOSIS — Z98.891 HISTORY OF UTERINE SCAR FROM PREVIOUS SURGERY: Chronic | ICD-10-CM

## 2020-08-03 DIAGNOSIS — Z98.82 BREAST IMPLANT STATUS: Chronic | ICD-10-CM

## 2020-08-03 DIAGNOSIS — Z98.890 OTHER SPECIFIED POSTPROCEDURAL STATES: Chronic | ICD-10-CM

## 2020-08-03 LAB
APTT BLD: 29.5 SEC — SIGNIFICANT CHANGE UP (ref 27.5–35.5)
BASOPHILS # BLD AUTO: 0.05 K/UL — SIGNIFICANT CHANGE UP (ref 0–0.2)
BASOPHILS NFR BLD AUTO: 0.7 % — SIGNIFICANT CHANGE UP (ref 0–2)
CANCER AG125 SERPL-ACNC: 26 U/ML — SIGNIFICANT CHANGE UP
EOSINOPHIL # BLD AUTO: 0.06 K/UL — SIGNIFICANT CHANGE UP (ref 0–0.5)
EOSINOPHIL NFR BLD AUTO: 0.9 % — SIGNIFICANT CHANGE UP (ref 0–6)
HCT VFR BLD CALC: 40.7 % — SIGNIFICANT CHANGE UP (ref 34.5–45)
HGB BLD-MCNC: 13.8 G/DL — SIGNIFICANT CHANGE UP (ref 11.5–15.5)
IMM GRANULOCYTES NFR BLD AUTO: 0.6 % — SIGNIFICANT CHANGE UP (ref 0–1.5)
INR BLD: 1.13 RATIO — SIGNIFICANT CHANGE UP (ref 0.88–1.16)
LYMPHOCYTES # BLD AUTO: 1.93 K/UL — SIGNIFICANT CHANGE UP (ref 1–3.3)
LYMPHOCYTES # BLD AUTO: 28.6 % — SIGNIFICANT CHANGE UP (ref 13–44)
MCHC RBC-ENTMCNC: 32.1 PG — SIGNIFICANT CHANGE UP (ref 27–34)
MCHC RBC-ENTMCNC: 33.9 GM/DL — SIGNIFICANT CHANGE UP (ref 32–36)
MCV RBC AUTO: 94.7 FL — SIGNIFICANT CHANGE UP (ref 80–100)
MONOCYTES # BLD AUTO: 0.38 K/UL — SIGNIFICANT CHANGE UP (ref 0–0.9)
MONOCYTES NFR BLD AUTO: 5.6 % — SIGNIFICANT CHANGE UP (ref 2–14)
NEUTROPHILS # BLD AUTO: 4.29 K/UL — SIGNIFICANT CHANGE UP (ref 1.8–7.4)
NEUTROPHILS NFR BLD AUTO: 63.6 % — SIGNIFICANT CHANGE UP (ref 43–77)
PLATELET # BLD AUTO: 278 K/UL — SIGNIFICANT CHANGE UP (ref 150–400)
PROTHROM AB SERPL-ACNC: 13 SEC — SIGNIFICANT CHANGE UP (ref 10.6–13.6)
RBC # BLD: 4.3 M/UL — SIGNIFICANT CHANGE UP (ref 3.8–5.2)
RBC # FLD: 12.3 % — SIGNIFICANT CHANGE UP (ref 10.3–14.5)
WBC # BLD: 6.75 K/UL — SIGNIFICANT CHANGE UP (ref 3.8–10.5)
WBC # FLD AUTO: 6.75 K/UL — SIGNIFICANT CHANGE UP (ref 3.8–10.5)

## 2020-08-03 PROCEDURE — 85610 PROTHROMBIN TIME: CPT

## 2020-08-03 PROCEDURE — 86901 BLOOD TYPING SEROLOGIC RH(D): CPT

## 2020-08-03 PROCEDURE — 80053 COMPREHEN METABOLIC PANEL: CPT

## 2020-08-03 PROCEDURE — 84702 CHORIONIC GONADOTROPIN TEST: CPT

## 2020-08-03 PROCEDURE — 86850 RBC ANTIBODY SCREEN: CPT

## 2020-08-03 PROCEDURE — 86900 BLOOD TYPING SEROLOGIC ABO: CPT

## 2020-08-03 PROCEDURE — 36415 COLL VENOUS BLD VENIPUNCTURE: CPT

## 2020-08-03 PROCEDURE — 82248 BILIRUBIN DIRECT: CPT

## 2020-08-03 PROCEDURE — G0463: CPT | Mod: 25

## 2020-08-03 PROCEDURE — 83036 HEMOGLOBIN GLYCOSYLATED A1C: CPT

## 2020-08-03 PROCEDURE — 93010 ELECTROCARDIOGRAM REPORT: CPT

## 2020-08-03 PROCEDURE — U0003: CPT

## 2020-08-03 PROCEDURE — 85730 THROMBOPLASTIN TIME PARTIAL: CPT

## 2020-08-03 PROCEDURE — 93005 ELECTROCARDIOGRAM TRACING: CPT

## 2020-08-03 PROCEDURE — 85025 COMPLETE CBC W/AUTO DIFF WBC: CPT

## 2020-08-03 PROCEDURE — 86304 IMMUNOASSAY TUMOR CA 125: CPT

## 2020-08-03 NOTE — H&P PST ADULT - NSICDXPASTMEDICALHX_GEN_ALL_CORE_FT
PAST MEDICAL HISTORY:  BRCA gene positive     Breast cancer DCIS    Narcolepsy PAST MEDICAL HISTORY:  BRCA gene positive     Breast cancer DCIS left    Narcolepsy

## 2020-08-03 NOTE — H&P PST ADULT - ASSESSMENT
39 y/o female with history of left DCIS, BRCA 2 positive, narcolepsy, S/P b/l mastectomy with b/l breast reconstruction. Pt denies breast pain. She is scheduled for Laparoscopic b/l salpingo oophorectomy, b/l delayed breast implant, capsular repair, b/l fat grafting.   Plan  1. Stop all NSAIDS, herbal supplements and vitamins for 7 days.  2. NPO as per ASU instructions  3. COVID test done today  4. Use EZ sponges as directed  5. Use mupirocin as directed  6. Labs, EKG as per surgeon. STAT urine pregnancy test on admission.  7. Advised to quarantine prior to surgery

## 2020-08-03 NOTE — H&P PST ADULT - EYES
oriented to person, place and time oriented to person, place and time EOMI; PERRL; no drainage or redness

## 2020-08-03 NOTE — H&P PST ADULT - NSICDXFAMILYHX_GEN_ALL_CORE_FT
FAMILY HISTORY:  Family history of anticardiolipin syndrome, mother FAMILY HISTORY:  Family history of anticardiolipin syndrome, mother  Family history of pancreatic cancer, father  Family history of rheumatic fever, mother

## 2020-08-03 NOTE — H&P PST ADULT - NSANTHOSAYNRD_GEN_A_CORE
No. TUNDE screening performed.  STOP BANG Legend: 0-2 = LOW Risk; 3-4 = INTERMEDIATE Risk; 5-8 = HIGH Risk

## 2020-08-03 NOTE — H&P PST ADULT - HEALTH CARE MAINTENANCE
Any history of  international or out of state travel in the last 21 days?     NO  Any close contact with a person who is under investigation fo COVID-19 or had close contact with a confirmed COVID -19 person in the last 14 days?  NO  Any fever, cough, SOB?  No

## 2020-08-03 NOTE — H&P PST ADULT - HISTORY OF PRESENT ILLNESS
[age] y/o female with history of left DCIS, BRCA 2 positive, narcolepsy, S/P b/l mastectomy with b/l breast reconstruction. Pt denies breast pain. She is here for PST for planned Laparoscopic b/l salpingo oophorectomy, b/l delayed breast implant, capsular repair, b/l fat grafting.

## 2020-08-04 DIAGNOSIS — Z01.818 ENCOUNTER FOR OTHER PREPROCEDURAL EXAMINATION: ICD-10-CM

## 2020-08-04 DIAGNOSIS — Z15.09 GENETIC SUSCEPTIBILITY TO OTHER MALIGNANT NEOPLASM: ICD-10-CM

## 2020-08-04 LAB
A1C WITH ESTIMATED AVERAGE GLUCOSE RESULT: 4.7 % — SIGNIFICANT CHANGE UP (ref 4–5.6)
ESTIMATED AVERAGE GLUCOSE: 88 MG/DL — SIGNIFICANT CHANGE UP (ref 68–114)
SARS-COV-2 RNA SPEC QL NAA+PROBE: SIGNIFICANT CHANGE UP

## 2020-08-05 RX ORDER — SODIUM CHLORIDE 9 MG/ML
1000 INJECTION, SOLUTION INTRAVENOUS
Refills: 0 | Status: DISCONTINUED | OUTPATIENT
Start: 2020-08-06 | End: 2020-08-06

## 2020-08-05 RX ORDER — ONDANSETRON 8 MG/1
4 TABLET, FILM COATED ORAL EVERY 6 HOURS
Refills: 0 | Status: DISCONTINUED | OUTPATIENT
Start: 2020-08-06 | End: 2020-08-06

## 2020-08-05 RX ORDER — SODIUM CHLORIDE 9 MG/ML
3 INJECTION INTRAMUSCULAR; INTRAVENOUS; SUBCUTANEOUS EVERY 8 HOURS
Refills: 0 | Status: DISCONTINUED | OUTPATIENT
Start: 2020-08-06 | End: 2020-08-07

## 2020-08-06 ENCOUNTER — INPATIENT (INPATIENT)
Facility: HOSPITAL | Age: 40
LOS: 0 days | Discharge: HOME | DRG: 743 | End: 2020-08-07
Attending: OBSTETRICS & GYNECOLOGY | Admitting: OBSTETRICS & GYNECOLOGY
Payer: COMMERCIAL

## 2020-08-06 ENCOUNTER — RESULT REVIEW (OUTPATIENT)
Age: 40
End: 2020-08-06

## 2020-08-06 VITALS
TEMPERATURE: 98 F | OXYGEN SATURATION: 100 % | HEIGHT: 66 IN | WEIGHT: 136.03 LBS | SYSTOLIC BLOOD PRESSURE: 134 MMHG | DIASTOLIC BLOOD PRESSURE: 56 MMHG | RESPIRATION RATE: 16 BRPM | HEART RATE: 70 BPM

## 2020-08-06 DIAGNOSIS — Z15.09 GENETIC SUSCEPTIBILITY TO OTHER MALIGNANT NEOPLASM: ICD-10-CM

## 2020-08-06 DIAGNOSIS — Z90.13 ACQUIRED ABSENCE OF BILATERAL BREASTS AND NIPPLES: Chronic | ICD-10-CM

## 2020-08-06 DIAGNOSIS — Z98.82 BREAST IMPLANT STATUS: Chronic | ICD-10-CM

## 2020-08-06 DIAGNOSIS — Z98.890 OTHER SPECIFIED POSTPROCEDURAL STATES: Chronic | ICD-10-CM

## 2020-08-06 DIAGNOSIS — Z98.891 HISTORY OF UTERINE SCAR FROM PREVIOUS SURGERY: Chronic | ICD-10-CM

## 2020-08-06 LAB — HCG UR QL: NEGATIVE — SIGNIFICANT CHANGE UP

## 2020-08-06 PROCEDURE — 88104 CYTOPATH FL NONGYN SMEARS: CPT | Mod: 26

## 2020-08-06 PROCEDURE — 88304 TISSUE EXAM BY PATHOLOGIST: CPT

## 2020-08-06 PROCEDURE — 88305 TISSUE EXAM BY PATHOLOGIST: CPT

## 2020-08-06 PROCEDURE — C1789: CPT

## 2020-08-06 PROCEDURE — 88300 SURGICAL PATH GROSS: CPT | Mod: 26,59

## 2020-08-06 PROCEDURE — 82962 GLUCOSE BLOOD TEST: CPT

## 2020-08-06 PROCEDURE — 88305 TISSUE EXAM BY PATHOLOGIST: CPT | Mod: 26,59

## 2020-08-06 PROCEDURE — 88300 SURGICAL PATH GROSS: CPT

## 2020-08-06 PROCEDURE — 88104 CYTOPATH FL NONGYN SMEARS: CPT

## 2020-08-06 PROCEDURE — 88304 TISSUE EXAM BY PATHOLOGIST: CPT | Mod: 26,59

## 2020-08-06 PROCEDURE — 58661 LAPAROSCOPY REMOVE ADNEXA: CPT | Mod: 50

## 2020-08-06 PROCEDURE — 81025 URINE PREGNANCY TEST: CPT

## 2020-08-06 RX ORDER — FENTANYL CITRATE 50 UG/ML
50 INJECTION INTRAVENOUS
Refills: 0 | Status: DISCONTINUED | OUTPATIENT
Start: 2020-08-06 | End: 2020-08-06

## 2020-08-06 RX ORDER — OXYCODONE HYDROCHLORIDE 5 MG/1
5 TABLET ORAL EVERY 4 HOURS
Refills: 0 | Status: DISCONTINUED | OUTPATIENT
Start: 2020-08-06 | End: 2020-08-07

## 2020-08-06 RX ORDER — DEXTROAMPHETAMINE SACCHARATE, AMPHETAMINE ASPARTATE, DEXTROAMPHETAMINE SULFATE AND AMPHETAMINE SULFATE 1.875; 1.875; 1.875; 1.875 MG/1; MG/1; MG/1; MG/1
1 TABLET ORAL
Qty: 0 | Refills: 0 | DISCHARGE

## 2020-08-06 RX ORDER — OXYCODONE HYDROCHLORIDE 5 MG/1
5 TABLET ORAL ONCE
Refills: 0 | Status: DISCONTINUED | OUTPATIENT
Start: 2020-08-06 | End: 2020-08-06

## 2020-08-06 RX ORDER — MUPIROCIN 20 MG/G
1 OINTMENT TOPICAL
Qty: 0 | Refills: 0 | DISCHARGE

## 2020-08-06 RX ORDER — MORPHINE SULFATE 50 MG/1
4 CAPSULE, EXTENDED RELEASE ORAL EVERY 4 HOURS
Refills: 0 | Status: DISCONTINUED | OUTPATIENT
Start: 2020-08-06 | End: 2020-08-07

## 2020-08-06 RX ORDER — FAMOTIDINE 10 MG/ML
20 INJECTION INTRAVENOUS ONCE
Refills: 0 | Status: COMPLETED | OUTPATIENT
Start: 2020-08-06 | End: 2020-08-06

## 2020-08-06 RX ORDER — CEFAZOLIN SODIUM 1 G
2000 VIAL (EA) INJECTION EVERY 8 HOURS
Refills: 0 | Status: COMPLETED | OUTPATIENT
Start: 2020-08-06 | End: 2020-08-06

## 2020-08-06 RX ORDER — SODIUM CHLORIDE 9 MG/ML
1000 INJECTION, SOLUTION INTRAVENOUS
Refills: 0 | Status: DISCONTINUED | OUTPATIENT
Start: 2020-08-06 | End: 2020-08-07

## 2020-08-06 RX ORDER — ACETAMINOPHEN 500 MG
975 TABLET ORAL ONCE
Refills: 0 | Status: COMPLETED | OUTPATIENT
Start: 2020-08-06 | End: 2020-08-06

## 2020-08-06 RX ADMIN — OXYCODONE HYDROCHLORIDE 5 MILLIGRAM(S): 5 TABLET ORAL at 22:24

## 2020-08-06 RX ADMIN — FENTANYL CITRATE 50 MICROGRAM(S): 50 INJECTION INTRAVENOUS at 12:09

## 2020-08-06 RX ADMIN — Medication 975 MILLIGRAM(S): at 07:09

## 2020-08-06 RX ADMIN — FENTANYL CITRATE 50 MICROGRAM(S): 50 INJECTION INTRAVENOUS at 13:31

## 2020-08-06 RX ADMIN — FAMOTIDINE 20 MILLIGRAM(S): 10 INJECTION INTRAVENOUS at 07:09

## 2020-08-06 RX ADMIN — FENTANYL CITRATE 50 MICROGRAM(S): 50 INJECTION INTRAVENOUS at 14:54

## 2020-08-06 RX ADMIN — FENTANYL CITRATE 50 MICROGRAM(S): 50 INJECTION INTRAVENOUS at 13:34

## 2020-08-06 RX ADMIN — SODIUM CHLORIDE 3 MILLILITER(S): 9 INJECTION INTRAMUSCULAR; INTRAVENOUS; SUBCUTANEOUS at 22:00

## 2020-08-06 RX ADMIN — Medication 100 MILLIGRAM(S): at 18:08

## 2020-08-06 RX ADMIN — SODIUM CHLORIDE 3 MILLILITER(S): 9 INJECTION INTRAMUSCULAR; INTRAVENOUS; SUBCUTANEOUS at 14:00

## 2020-08-06 RX ADMIN — OXYCODONE HYDROCHLORIDE 5 MILLIGRAM(S): 5 TABLET ORAL at 12:30

## 2020-08-06 RX ADMIN — OXYCODONE HYDROCHLORIDE 5 MILLIGRAM(S): 5 TABLET ORAL at 21:54

## 2020-08-06 RX ADMIN — FENTANYL CITRATE 50 MICROGRAM(S): 50 INJECTION INTRAVENOUS at 12:22

## 2020-08-06 RX ADMIN — FENTANYL CITRATE 50 MICROGRAM(S): 50 INJECTION INTRAVENOUS at 14:33

## 2020-08-06 NOTE — ASU DISCHARGE PLAN (ADULT/PEDIATRIC) - CARE PROVIDER_API CALL
Clinton Malagon  GYNECOLOGIC ONCOLOGY  [facility] [address] [place] [address]  Phone: [phone]  Fax: [phone]  Follow Up Time: 2 weeks    Billy Flanagan  PLASTIC SURGERY  [address] [facility], [facility] [address]  [place] [address]  Phone: [phone]  Fax: [phone]  Follow Up Time:

## 2020-08-06 NOTE — BRIEF OPERATIVE NOTE - NSICDXBRIEFPROCEDURE_GEN_ALL_CORE_FT
PROCEDURES:  Salpingoophorectomy, bilateral, during same operative episode, laparoscopic 06-Aug-2020 08:27:43  Yesy Ramos PROCEDURES:  Breast revision surgery 06-Aug-2020 11:55:56  Rich Mcmahon  Salpingoophorectomy, bilateral, during same operative episode, laparoscopic 06-Aug-2020 08:27:43  Yesy Ramos

## 2020-08-06 NOTE — ASU PATIENT PROFILE, ADULT - PSH
H/O  section    H/O rhinoplasty    S/P abdominoplasty    S/P bilateral mastectomy  with reconstruction 2020  S/P breast augmentation

## 2020-08-06 NOTE — BRIEF OPERATIVE NOTE - COMMENTS
Part of joint case with Dr. Flanagan for bilateral delayed implant capsular repair with dermal matrix and bilateral fat grafting. See that op report for total fluids and UOP.

## 2020-08-06 NOTE — ASU DISCHARGE PLAN (ADULT/PEDIATRIC) - ASU DC SPECIAL INSTRUCTIONSFT
May walk and climb stairs as often as youd like, no vigorous activity, do not lift anything greater than 10lbs, nothing per vagina x 6 weeks, do not drive while on pain medication.     Follow-up with Dr. Malagon in two weeks to review pathology report. May walk and climb stairs as often as youd like, no vigorous activity, do not lift anything greater than 10lbs, nothing per vagina x 6 weeks, do not drive while on pain medication.     Follow-up with Dr. Malagon in two weeks to review pathology report.  Follow up with Dr. Flanagan in 1 week

## 2020-08-06 NOTE — ASU DISCHARGE PLAN (ADULT/PEDIATRIC) - PROCEDURE
risk reducing bilateral salpingooophorectomy risk reducing bilateral salpingooophorectomy, bilateral delayed implant, capsular repair with dermal fat grafting

## 2020-08-07 VITALS
RESPIRATION RATE: 16 BRPM | TEMPERATURE: 98 F | DIASTOLIC BLOOD PRESSURE: 36 MMHG | OXYGEN SATURATION: 99 % | SYSTOLIC BLOOD PRESSURE: 99 MMHG | HEART RATE: 73 BPM

## 2020-08-07 RX ADMIN — OXYCODONE HYDROCHLORIDE 5 MILLIGRAM(S): 5 TABLET ORAL at 10:15

## 2020-08-07 RX ADMIN — OXYCODONE HYDROCHLORIDE 5 MILLIGRAM(S): 5 TABLET ORAL at 05:28

## 2020-08-07 RX ADMIN — OXYCODONE HYDROCHLORIDE 5 MILLIGRAM(S): 5 TABLET ORAL at 01:44

## 2020-08-07 RX ADMIN — OXYCODONE HYDROCHLORIDE 5 MILLIGRAM(S): 5 TABLET ORAL at 02:14

## 2020-08-07 RX ADMIN — OXYCODONE HYDROCHLORIDE 5 MILLIGRAM(S): 5 TABLET ORAL at 09:30

## 2020-08-07 RX ADMIN — SODIUM CHLORIDE 3 MILLILITER(S): 9 INJECTION INTRAMUSCULAR; INTRAVENOUS; SUBCUTANEOUS at 07:38

## 2020-08-07 RX ADMIN — OXYCODONE HYDROCHLORIDE 5 MILLIGRAM(S): 5 TABLET ORAL at 05:58

## 2020-08-07 NOTE — PROGRESS NOTE ADULT - ASSESSMENT
POD#1 s/p breast revision surgery and BL salpingooophorectomy  -Stable   -Continue post operative care  -: voiding spontaneously  -GI: flatus present  -DVT ppx: SCDs  -D/C today

## 2020-08-07 NOTE — PROGRESS NOTE ADULT - SUBJECTIVE AND OBJECTIVE BOX
Doing well  Nausea resolved    Breasts:  incision CDI.  No collections.    Plan  DC home  Follow up next week.

## 2020-08-07 NOTE — PROGRESS NOTE ADULT - SUBJECTIVE AND OBJECTIVE BOX
GYNECOLOGIC ONCOLOGY PROGRESS NOTE    POD#1 s/p breast revision surgery and BL salpingooophorectomy    PROBLEMS:  BRCA2 gene muttation positive    Pt seen and examined at bedside in 2N    SUBJECTIVE:  Overnight, patient had n/v and reported severe pain.   This AM, patient reports doing better. Still has pain but much more controlled  Pain well-controlled.  Flatus: yes  Denies Nausea, Vomiting or Diarrhea this AM  Denies shortness of breath, chest pain or dyspnea on exertion.  Tolerating diet.    OBJECTIVE:     VITALS:  T(F): 98.3 (08-07-20 @ 05:18), Max: 100.3 (08-07-20 @ 00:38)  HR: 73 (08-07-20 @ 05:18) (62 - 85)  BP: 99/50 (08-07-20 @ 05:18) (92/50 - 112/51)  RR: 16 (08-07-20 @ 05:18) (10 - 19)  SpO2: 100% (08-07-20 @ 05:18) (96% - 100%)    I&O's Summary    06 Aug 2020 07:01  -  07 Aug 2020 07:00  --------------------------------------------------------  IN: 2081 mL / OUT: 400 mL / NET: 1681 mL        MEDICATIONS  (STANDING):  lactated ringers. 1000 milliLiter(s) (100 mL/Hr) IV Continuous <Continuous>  sodium chloride 0.9% lock flush 3 milliLiter(s) IV Push every 8 hours    MEDICATIONS  (PRN):  morphine  - Injectable 4 milliGRAM(s) IV Push every 4 hours PRN breakthrough pain  oxyCODONE    IR 5 milliGRAM(s) Oral every 4 hours PRN Mild Pain (1 - 3)  oxyCODONE    IR 5 milliGRAM(s) Oral every 4 hours PRN Moderate Pain (4 - 6)      Physical Exam:  Constitutional: NAD  Chest; deferred  Pulmonary: clear to auscultation bilaterally   Cardiovascular: Regular rate and rhythm   Abdomen: soft, non-tender, non-distended, normal bowel sounds, incision sites c/d/i  Extremities: no lower extremity edema or calve tenderness, Shanell's sign negative.

## 2020-08-11 PROBLEM — G47.419 NARCOLEPSY WITHOUT CATAPLEXY: Chronic | Status: ACTIVE | Noted: 2020-08-03

## 2020-08-11 PROBLEM — Z15.01 GENETIC SUSCEPTIBILITY TO MALIGNANT NEOPLASM OF BREAST: Chronic | Status: ACTIVE | Noted: 2020-08-03

## 2020-08-11 PROBLEM — C50.919 MALIGNANT NEOPLASM OF UNSPECIFIED SITE OF UNSPECIFIED FEMALE BREAST: Chronic | Status: ACTIVE | Noted: 2020-08-03

## 2020-08-12 DIAGNOSIS — Z85.3 PERSONAL HISTORY OF MALIGNANT NEOPLASM OF BREAST: ICD-10-CM

## 2020-08-12 DIAGNOSIS — Z15.02 GENETIC SUSCEPTIBILITY TO MALIGNANT NEOPLASM OF OVARY: ICD-10-CM

## 2020-08-12 DIAGNOSIS — Z90.711 ACQUIRED ABSENCE OF UTERUS WITH REMAINING CERVICAL STUMP: ICD-10-CM

## 2020-08-12 DIAGNOSIS — Z40.02 ENCOUNTER FOR PROPHYLACTIC REMOVAL OF OVARY(S): ICD-10-CM

## 2020-08-12 DIAGNOSIS — N65.0 DEFORMITY OF RECONSTRUCTED BREAST: ICD-10-CM

## 2020-08-12 DIAGNOSIS — Z90.13 ACQUIRED ABSENCE OF BILATERAL BREASTS AND NIPPLES: ICD-10-CM

## 2020-08-12 DIAGNOSIS — R11.0 NAUSEA: ICD-10-CM

## 2020-08-19 ENCOUNTER — APPOINTMENT (OUTPATIENT)
Dept: GYNECOLOGIC ONCOLOGY | Facility: CLINIC | Age: 40
End: 2020-08-19
Payer: COMMERCIAL

## 2020-08-19 DIAGNOSIS — Z15.09 GENETIC SUSCEPTIBILITY TO MALIGNANT NEOPLASM OF BREAST: ICD-10-CM

## 2020-08-19 DIAGNOSIS — Z15.01 GENETIC SUSCEPTIBILITY TO MALIGNANT NEOPLASM OF BREAST: ICD-10-CM

## 2020-08-19 PROCEDURE — 99024 POSTOP FOLLOW-UP VISIT: CPT

## 2020-08-30 DIAGNOSIS — N95.1 MENOPAUSAL AND FEMALE CLIMACTERIC STATES: ICD-10-CM

## 2020-09-11 ENCOUNTER — APPOINTMENT (OUTPATIENT)
Dept: GYNECOLOGIC ONCOLOGY | Facility: CLINIC | Age: 40
End: 2020-09-11
Payer: COMMERCIAL

## 2020-09-11 PROCEDURE — 99214 OFFICE O/P EST MOD 30 MIN: CPT

## 2020-09-15 NOTE — END OF VISIT
[FreeTextEntry3] : Written by Karen Pride, acting as a scribe for Dr. Clinton Malagon \par This note accurately reflects the work and decisions made by me.

## 2020-09-15 NOTE — PHYSICAL EXAM
[Normal] : Bimanual Exam: Normal [de-identified] : Karen Pride Medical assistant chaperoned during gynecologic exam.

## 2020-09-15 NOTE — HISTORY OF PRESENT ILLNESS
[FreeTextEntry1] : This 40 year old recently underwent bilateral mastectomy and prophylactic BSO. The patient has a history of DCIS of the breast and a BRCA mutation. Patient recently contacted me via email stating she was experiencing significant vasomotor menopausal symptoms and that her hormonal changes had also been affecting her sleep. We discussed at length the various remedies (hormonal and non-hormonal) for the treatment of menopausal symptoms in a patient in her situation that undergoes surgical menopause at a relatively young age and has not had a hysterectomy. The rationale for why HRT in general is contraindicated in someone with a history of breast cancer (even if it is DCIS and non-invasive) was discussed in detail. In general the patient preferred to hold off on HRT and to try non-hormonal remedies to see if they will work. Our plan was to try non-hormonal remedies to see if they work. I prescribed a relatively low dose Climara patch and micronized progesterone in case Mackenzie is unable to get improvement and feels that she needs HRT right away. Patient returns to the office today to evaluate her symptoms and to discuss her options further.

## 2020-09-15 NOTE — ASSESSMENT
[FreeTextEntry1] : Patient states that she started on Climara patch and will start on low dose progesterone. Patient stated she felt very emotional and hot flashes which have improved since being on the patch. Patient expressed her concern about her sex life and being afraid of her having vaginal dryness or low libido. I explained to the patient that hormones will help with that. I advised the patient that there is always a small risk of developing a malignancy even after prophylactic surgery without hormonal therapy but I am recommending patient follow up with her breast surgeon to further discuss her options. Otherwise patient is doing well from a gynecological stand point and will return in 1 month for a follow up. Patient understands that if she is feeling better at that time she will return to her GYN for routine gynecological care. Patient stated she understood and agreed to comply.

## 2020-09-28 ENCOUNTER — RX CHANGE (OUTPATIENT)
Age: 40
End: 2020-09-28

## 2020-10-06 NOTE — REASON FOR VISIT
[Post Op] : post op visit [de-identified] : 8/6/2020 [de-identified] : laparoscopic bilateral salpingo-oohprectomy for risk reduction BRCA 2 mutation carrier. Surgery was done as combined case with Dr. Flanagan at [facility] on 8/6/2020

## 2020-10-06 NOTE — DISCUSSION/SUMMARY
[Clean] : was clean [Dry] : was dry [Intact] : was intact [None] : had no drainage [Normal Skin] : normal appearance [Normal Skin Turgor] : normal skin turgor [Doing Well] : is doing well [Excellent Pain Control] : has excellent pain control [No Sign of Infection] : is showing no signs of infection [Findings] : These findings were discussed with [unfilled] in detail. She understood and accepted the rationale for this recommendation and also understood the serious impact that these findings could have upon her prognosis for survival. [Erythema] : was not erythematous [Ecchymosis] : was not ecchymotic [Seroma] : had no seroma [Firm] : soft [Tender] : nontender [Abnormal Bowel Sounds] : normal bowel sounds [Rebound] : no rebound tenderness [Guarding] : no guarding [Incisional Hernia] : no incisional hernia [Mass] : no palpable mass [Cervical Abnormality] : normal cervix [External Genitalia Abnormal] : normal external genitalia [Vaginal Exam Abnormal] : normal vaginal exam [FreeTextEntry1] : Pertinent findings during operation were reviewed. Post-operative care and final pathology were reviewed. We discussed the possible side affects from surgical menopause and remedies for treating symptoms. At this time I recommend giving her body to heal and to evaluate her symptoms in a couple of weeks.

## 2020-10-16 ENCOUNTER — APPOINTMENT (OUTPATIENT)
Dept: GYNECOLOGIC ONCOLOGY | Facility: CLINIC | Age: 40
End: 2020-10-16
Payer: COMMERCIAL

## 2020-10-16 PROCEDURE — 99214 OFFICE O/P EST MOD 30 MIN: CPT

## 2020-10-19 NOTE — ASSESSMENT
[FreeTextEntry1] : Patient states that other then feeling like she does not have the same sex drive she had before her other symptoms have subsided for the most part. I advised the patient that testosterone does effect female libido. I also reminding the patient that she did just recently go through two major procedures and that how she is feeling could be contributed to self image and emotions. I discussed the importance of communication on how patient is feeling will help her feel better. Patient has not had a period yet and I advised her she might not and that is normal. I am slightly increasing her dose of progesterone to see if that helps as well. Otherwise patient is doing well from a gynecological stand point and will return in February for a pelvis exam. Patient stated she understood and agreed to comply.

## 2020-10-19 NOTE — REASON FOR VISIT
[FreeTextEntry1] : [place] Location \par \par [facility] [place]. [phone]\par [address] [place] [address]

## 2020-10-19 NOTE — PHYSICAL EXAM
[Normal] : No focal neurologic defects observed [de-identified] : Karen Pride Medical assistant chaperoned during discussion.

## 2020-10-19 NOTE — HISTORY OF PRESENT ILLNESS
[FreeTextEntry1] : This 40 year old recently underwent bilateral mastectomy and prophylactic BSO. The patient has a history of DCIS of the breast ER CO + and a BRCA mutation. Patient stated she was experiencing significant vasomotor menopausal symptoms and that her hormonal changes had also been affecting her sleep. We discussed at length the various remedies (hormonal and non-hormonal) for the treatment of menopausal symptoms in a patient in her situation that undergoes surgical menopause at a relatively young age and has not had a hysterectomy. The rationale for why HRT in general is contraindicated in someone with a history of breast cancer (even if it is DCIS and non-invasive) was discussed in detail. I prescribed a relatively low dose Climara patch and micronized progesterone. Patient was seen on 9/11/2020 and stated that she has had improvement in her symptoms since starting HRT. My recommendation was for her to follow up with her breast surgeon to further discuss long term use of HRT. Patient returns to the office today for a 1 month follow up.

## 2020-10-26 ENCOUNTER — RX CHANGE (OUTPATIENT)
Age: 40
End: 2020-10-26

## 2020-11-04 NOTE — H&P PST ADULT - NSICDXPASTSURGICALHX_GEN_ALL_CORE_FT
calm PAST SURGICAL HISTORY:  H/O  section     H/O rhinoplasty     S/P abdominoplasty     S/P bilateral mastectomy with reconstruction 2020    S/P breast augmentation

## 2020-11-09 ENCOUNTER — APPOINTMENT (OUTPATIENT)
Dept: OBGYN | Facility: CLINIC | Age: 40
End: 2020-11-09

## 2020-12-15 PROBLEM — N76.0 ACUTE VAGINITIS: Status: RESOLVED | Noted: 2017-10-24 | Resolved: 2020-12-15

## 2020-12-21 PROBLEM — Z01.419 ENCOUNTER FOR GYNECOLOGICAL EXAMINATION WITH PAPANICOLAOU SMEAR OF CERVIX: Status: RESOLVED | Noted: 2018-04-16 | Resolved: 2020-12-21

## 2021-01-19 ENCOUNTER — APPOINTMENT (OUTPATIENT)
Dept: OBGYN | Facility: CLINIC | Age: 41
End: 2021-01-19
Payer: COMMERCIAL

## 2021-01-19 VITALS
WEIGHT: 138 LBS | BODY MASS INDEX: 22.18 KG/M2 | DIASTOLIC BLOOD PRESSURE: 75 MMHG | SYSTOLIC BLOOD PRESSURE: 111 MMHG | HEIGHT: 66 IN

## 2021-01-19 DIAGNOSIS — Z01.419 ENCOUNTER FOR GYNECOLOGICAL EXAMINATION (GENERAL) (ROUTINE) W/OUT ABNORMAL FINDINGS: ICD-10-CM

## 2021-01-19 PROCEDURE — 99395 PREV VISIT EST AGE 18-39: CPT

## 2021-01-19 PROCEDURE — 99072 ADDL SUPL MATRL&STAF TM PHE: CPT

## 2021-01-19 NOTE — PHYSICAL EXAM
[Awake] : awake [Alert] : alert [Acute Distress] : no acute distress [Mass] : no breast mass [Nipple Discharge] : no nipple discharge [Axillary LAD] : no axillary lymphadenopathy [Soft] : soft [Tender] : non tender [Oriented x3] : oriented to person, place, and time [Normal] : uterus [No Bleeding] : there was no active vaginal bleeding [Adnexa Absent] : absent bilaterally [RRR, No Murmurs] : RRR, no murmurs [CTAB] : CTAB

## 2021-01-19 NOTE — DISCUSSION/SUMMARY
[FreeTextEntry1] : regarding her Vasomotor symptoms patient has found some relief using Climara patch prescribed by GYN oncology; her breast surgeon however has counseled her against this except for a short period of time. I did discuss considering topical estrogen creams for vaginal atrophy and a low amount of systolic solution associated with this. Patient will consider this.

## 2021-01-21 LAB — HPV HIGH+LOW RISK DNA PNL CVX: NOT DETECTED

## 2021-01-25 LAB — CYTOLOGY CVX/VAG DOC THIN PREP: ABNORMAL

## 2021-03-01 ENCOUNTER — RX RENEWAL (OUTPATIENT)
Age: 41
End: 2021-03-01

## 2021-04-16 ENCOUNTER — APPOINTMENT (OUTPATIENT)
Dept: GYNECOLOGIC ONCOLOGY | Facility: CLINIC | Age: 41
End: 2021-04-16
Payer: COMMERCIAL

## 2021-04-30 ENCOUNTER — APPOINTMENT (OUTPATIENT)
Dept: GYNECOLOGIC ONCOLOGY | Facility: CLINIC | Age: 41
End: 2021-04-30
Payer: COMMERCIAL

## 2021-06-04 ENCOUNTER — APPOINTMENT (OUTPATIENT)
Dept: GYNECOLOGIC ONCOLOGY | Facility: CLINIC | Age: 41
End: 2021-06-04
Payer: COMMERCIAL

## 2021-06-04 PROCEDURE — 99213 OFFICE O/P EST LOW 20 MIN: CPT

## 2021-06-04 PROCEDURE — 99072 ADDL SUPL MATRL&STAF TM PHE: CPT

## 2021-06-06 NOTE — ASSESSMENT
[FreeTextEntry1] : 40 y/o female w/ BRCA 2 mutation, hx of DCIS of the breast ER, [place] +, s/p bilateral mastectomy and prophylactic BSO on 8/6/2020. Pt is currently on Climara patch and  micronized progesterone. Menopausal symptoms have significantly improved since starting HRT. Pt reports recently suffering from loss of words. I explained from time to time  I hear these subtle neurological deficits that are more than likely due to stress rather than an actual medical reason. Within the last year she has experienced tremendous amount of stress and forced to make important medical decisions that are minds are not meant to deal with. Pt admits to going for therapy. From a gynecological stand point she is doing well. She wishes to switch from the patch to oral medication for the summer due to cosmetics.  Pt will return to my office in six months for a f/u exam.

## 2021-06-06 NOTE — HISTORY OF PRESENT ILLNESS
[FreeTextEntry1] : 40 y/o female w/ BRCA 2 mutation, hx of DCIS of the breast ER, [place] +, s/p bilateral mastectomy and prophylactic BSO on 8/6/2020. Post operatively pt was experiencing interrupt sleep cycles and vasomotor menopausal symptoms. The rationale for why HRT in general is contraindicated in someone with a history of breast cancer (even if it is DCIS and non-invasive) was discussed in detail. After this discussion pt wanted to proceed w/ a low dose Climara patch and micronized progesterone. Since starting HRT her symptoms significantly improved, although since last visit she admits to having a "loss of words". She returns today for a f/u exam.

## 2021-06-06 NOTE — REVIEW OF SYSTEMS
[Negative] : Psychiatric [Normal Sexual Function] : normal sexual function [Hot Flashes] : no hot flashes [Vaginal Discharge] : no vaginal discharge [Abn Vag Bleeding] : no abnormal vaginal bleeding [FreeTextEntry2] : recent loss of words

## 2021-06-06 NOTE — REASON FOR VISIT
[FreeTextEntry1] : [place] Location \par \par [facility] [place]. [phone]\par [address] [place] [id number]\par

## 2021-06-06 NOTE — PHYSICAL EXAM
[Absent] : Adnexa(ae): Absent [Normal] : Bimanual Exam: Normal [de-identified] : Sofiya Jordan MA was present the entire time of gynecological exam

## 2021-06-07 ENCOUNTER — RX CHANGE (OUTPATIENT)
Age: 41
End: 2021-06-07

## 2021-06-29 ENCOUNTER — RX CHANGE (OUTPATIENT)
Age: 41
End: 2021-06-29

## 2021-08-09 ENCOUNTER — RX CHANGE (OUTPATIENT)
Age: 41
End: 2021-08-09

## 2021-08-27 ENCOUNTER — RX RENEWAL (OUTPATIENT)
Age: 41
End: 2021-08-27

## 2021-10-26 DIAGNOSIS — B00.9 HERPESVIRAL INFECTION, UNSPECIFIED: ICD-10-CM

## 2021-12-17 ENCOUNTER — APPOINTMENT (OUTPATIENT)
Dept: GYNECOLOGIC ONCOLOGY | Facility: CLINIC | Age: 41
End: 2021-12-17
Payer: COMMERCIAL

## 2021-12-17 VITALS
RESPIRATION RATE: 16 BRPM | DIASTOLIC BLOOD PRESSURE: 67 MMHG | SYSTOLIC BLOOD PRESSURE: 122 MMHG | WEIGHT: 138 LBS | BODY MASS INDEX: 22.18 KG/M2 | HEART RATE: 79 BPM | OXYGEN SATURATION: 98 % | HEIGHT: 66 IN

## 2021-12-17 PROCEDURE — 99213 OFFICE O/P EST LOW 20 MIN: CPT

## 2021-12-19 LAB — HPV HIGH+LOW RISK DNA PNL CVX: NOT DETECTED

## 2021-12-23 ENCOUNTER — APPOINTMENT (OUTPATIENT)
Dept: RADIATION ONCOLOGY | Facility: CLINIC | Age: 41
End: 2021-12-23
Payer: COMMERCIAL

## 2021-12-23 DIAGNOSIS — Z80.52 FAMILY HISTORY OF MALIGNANT NEOPLASM OF BLADDER: ICD-10-CM

## 2021-12-23 DIAGNOSIS — C50.212 MALIGNANT NEOPLASM OF UPPER-INNER QUADRANT OF LEFT FEMALE BREAST: ICD-10-CM

## 2021-12-23 DIAGNOSIS — Z17.0 MALIGNANT NEOPLASM OF UPPER-INNER QUADRANT OF LEFT FEMALE BREAST: ICD-10-CM

## 2021-12-23 DIAGNOSIS — Z86.59 PERSONAL HISTORY OF OTHER MENTAL AND BEHAVIORAL DISORDERS: ICD-10-CM

## 2021-12-23 PROCEDURE — 99203 OFFICE O/P NEW LOW 30 MIN: CPT | Mod: 25,95

## 2021-12-23 RX ORDER — ESTRADIOL 0.05 MG/D
0.05 PATCH TRANSDERMAL
Qty: 4 | Refills: 5 | Status: DISCONTINUED | COMMUNITY
Start: 2020-08-30 | End: 2021-12-23

## 2021-12-23 RX ORDER — ARMODAFINIL 200 MG/1
TABLET ORAL
Refills: 0 | Status: DISCONTINUED | COMMUNITY
End: 2021-12-23

## 2021-12-23 RX ORDER — PROGESTERONE 200 MG/1
200 CAPSULE ORAL
Qty: 14 | Refills: 4 | Status: DISCONTINUED | COMMUNITY
Start: 2021-11-02 | End: 2021-12-23

## 2021-12-23 RX ORDER — ESTROGENS, CONJUGATED 0.45 MG/1
0.45 TABLET, FILM COATED ORAL DAILY
Qty: 90 | Refills: 0 | Status: DISCONTINUED | COMMUNITY
Start: 2021-08-09 | End: 2021-12-23

## 2021-12-23 RX ORDER — DEXTROAMPHETAMINE SACCHARATE, AMPHETAMINE ASPARTATE, DEXTROAMPHETAMINE SULFATE, AND AMPHETAMINE SULFATE 3.75; 3.75; 3.75; 3.75 MG/1; MG/1; MG/1; MG/1
TABLET ORAL
Refills: 0 | Status: ACTIVE | COMMUNITY

## 2021-12-23 RX ORDER — PROGESTERONE 200 MG/1
200 CAPSULE ORAL AT BEDTIME
Qty: 42 | Refills: 1 | Status: DISCONTINUED | COMMUNITY
Start: 2020-08-30 | End: 2021-12-23

## 2021-12-23 RX ORDER — VALACYCLOVIR 500 MG/1
500 TABLET, FILM COATED ORAL TWICE DAILY
Qty: 14 | Refills: 2 | Status: DISCONTINUED | COMMUNITY
Start: 2021-10-26 | End: 2021-12-23

## 2021-12-23 RX ORDER — MULTIVIT-MIN/IRON/FOLIC ACID/K 18-600-40
CAPSULE ORAL
Refills: 0 | Status: DISCONTINUED | COMMUNITY
End: 2021-12-23

## 2021-12-23 RX ORDER — ESTROGENS, CONJUGATED 0.45 MG/1
0.45 TABLET, FILM COATED ORAL DAILY
Qty: 30 | Refills: 3 | Status: DISCONTINUED | COMMUNITY
Start: 2021-06-04 | End: 2021-12-23

## 2021-12-23 NOTE — VITALS
[Maximal Pain Intensity: 1/10] : 1/10 [90: Able to carry normal activity; minor signs or symptoms of disease.] : 90: Able to carry normal activity; minor signs or symptoms of disease.  (4) rarely moist

## 2021-12-23 NOTE — HISTORY OF PRESENT ILLNESS
[FreeTextEntry1] : The patient is a pleasant 41 year old female diagnosed with left breast cancer following mastecotomy for BRCA 2 diagnosis.\par She was first diagnosed with left breast DCIS grade 2 with comedonecrosis in the upper outer left quadrant, ER % CT 90% positive in October 2019 after she palpated a mass and a mammogram showed an area of pleomorphic microcalcifications. She is BRCA2 positive and had been going for mammograms every 6 months. [facility] genetic testing on 10/25/21 only showed the known BRCA2 mutation. She opted for a bilateral mastectomy with left sentinel node biopsy and immediate reconstruction with tissue expanders on 1/6/20. Pathology showed no residual carcinoma. 0/2 sentinel lymph nodes negative. Cryopreservation performed. Prophylactic laparoscopic bilateral salpingo-oophrectomy for risk reduction BRCA 2 mutation carrier performed on 8/6/20 by Dr. Malagon. Second stage reconstruction also performed at this time. She was sent for an MRI on 11/5/21, after palpating a lump, that showed a 1.4 cm nonenhancing soft tissue focus which abuts the sternum and the medial portion of the left implant. Left ultrasound on 11/29/21 showed a 1.4 x 1.2 x 1.7 cm irregular hypoechoic mass at 9:00. Biopsy performed on 12/2/21 showed invasive ductal carcinoma, poorly differentiated, ER %, CT 41-50%, HER2+ by FISH, and Ki67 60%. She is to schedule a lumpectomy of the mass and sentinel lymph node biopsy. She is scheduled to begin chemotherapy Monday. Some pain noted where the mass is since having her biopsy. She discontinued her progesterone and estradiol after her biopsy. She is scheduled ot begin TCPH next week and plans to use a cool cap. She consented to a telehealth consult to discuss the role of radiation therapy in her care.

## 2021-12-30 DIAGNOSIS — N76.0 ACUTE VAGINITIS: ICD-10-CM

## 2021-12-30 DIAGNOSIS — B96.89 ACUTE VAGINITIS: ICD-10-CM

## 2021-12-30 LAB — CYTOLOGY CVX/VAG DOC THIN PREP: ABNORMAL

## 2022-01-05 NOTE — PROCEDURE
[Cervical Pap] : cervical pap smear  [Other: ___] : [unfilled] [Patient] : the patient [Verbal Consent] : verbal consent was obtained prior to the procedure and is detailed in the patient's record [Yes] : the specimen was sent to pathology [No Complications] : none

## 2022-01-06 NOTE — HISTORY OF PRESENT ILLNESS
[FreeTextEntry1] : 42 y/o female w/ BRCA 2 mutation, hx of DCIS of the breast ER, [place] +, s/p bilateral mastectomy and prophylactic BSO on 8/6/2020. Post operatively pt was experiencing interrupt sleep cycles and vasomotor menopausal symptoms. The rationale for why HRT in general is contraindicated in someone with a history of breast cancer (even if it is DCIS and non-invasive) was discussed in detail. After this discussion pt wanted to proceed w/ a low dose Climara patch and micronized progesterone. Since starting HRT her symptoms significantly improved. During the summer patient wished to switch from patch or oral medication due to cosmetics. Patient was switched to Premarin and returns to the office today for a 6 month follow up. She reveals today that HRT was immediately discontinued due to recent diagnosis of a Stage 1 breast cancer, ER/DE/HER 2 positive, s/p double mastectomy.  \par \par She is following with her breast surgeon/Dr. Arthur and heme/onc with Dr. Lyle at [facility]. She admits that her biggest fear is losing her hair along with her youth while she undergoes treatment. This recent diagnosis has been testing the patient's strength, she became tearful once she began speaking of it. The patient feels like all of the positivity she had with her first diagnosis has worn out, she feels like she had a false sense of security and admits that it is hard to remain positive this time around. \par \par She had a PET/CT done earlier today. 
Home

## 2022-01-06 NOTE — ASSESSMENT
[FreeTextEntry1] : 42 y/o patient being followed for BRCA 2 mutation, with a history of DCIS of the breast ER/CO +, now with newly diagnosed Stage 1 breast cancer, ER/CO/HER 2 +. The patient is doing well from a gynecological standpoint. My condolences and advice offered to the patient for her recent diagnosis. Cervical pap smear was done. Patient understands that if her pap smear is abnormal she will have to return to the office for a colposcopy. Otherwise, she may f/u with me via TEB in 3 months while she's undergoing treatment.

## 2022-01-06 NOTE — END OF VISIT
[FreeTextEntry3] : Written by Rosalia Fernandez, acting as a scribe for Dr. Clinton Malagon \par This note accurately reflects the work and decisions made by me.

## 2022-01-06 NOTE — REASON FOR VISIT
[FreeTextEntry1] : [place] Location \par \par [facility] [place]. [phone]\par [address] [place] [address]

## 2022-01-06 NOTE — PHYSICAL EXAM
[Chaperone Present] : A chaperone was present in the examining room during all aspects of the physical examination [Absent] : Adnexa(ae): Absent [Normal] : Bimanual Exam: Normal [FreeTextEntry1] : Rosalia Fernandez, Medical assistant chaperoned during gynecologic exam.

## 2022-02-09 ENCOUNTER — APPOINTMENT (OUTPATIENT)
Dept: GYNECOLOGIC ONCOLOGY | Facility: CLINIC | Age: 42
End: 2022-02-09
Payer: COMMERCIAL

## 2022-02-09 VITALS
BODY MASS INDEX: 22.18 KG/M2 | HEART RATE: 89 BPM | WEIGHT: 138 LBS | DIASTOLIC BLOOD PRESSURE: 77 MMHG | OXYGEN SATURATION: 99 % | HEIGHT: 66 IN | RESPIRATION RATE: 16 BRPM | SYSTOLIC BLOOD PRESSURE: 130 MMHG

## 2022-02-09 DIAGNOSIS — N89.8 OTHER SPECIFIED NONINFLAMMATORY DISORDERS OF VAGINA: ICD-10-CM

## 2022-02-09 PROCEDURE — 99213 OFFICE O/P EST LOW 20 MIN: CPT

## 2022-02-11 NOTE — HISTORY OF PRESENT ILLNESS
[FreeTextEntry1] : 42 yo female known to our service for BRCA 2 mutation, with a history of DCIS of the breast ER/OR +, now with newly diagnosed Stage 1 breast cancer, ER/OR/HER 2 +. Pt was last seen in December 2021 for Cervical PAP. She presents today for followup visit for c/o new vaginal discharge. She was previously treated for BV noted on PAP in December 2021, at that time patient reports a slight vaginal discomfort that never fully resolved after treatment. She states new discharge has been present for about a week now. She describes it as yellow in color without odor. She denies any vaginal itching.

## 2022-02-11 NOTE — ASSESSMENT
[FreeTextEntry1] : 42 y/o patient being followed for BRCA 2 mutation, with a history of DCIS of the breast ER/AR +, now with newly diagnosed Stage 1 breast cancer, ER/AR/HER 2 +.\par \par She reports today with c/o vaginal discharge. White vaginal discharge with slight yellow tinge noted on examination. I discussed with patient that I would like to obtain one-swab prior to treating her due to the fact that she was previously treated for BV without full resolution. She also states that she is currently on many medications due to her Breast Cancer and therefore would not like to take any unecessary medication.

## 2022-02-11 NOTE — REASON FOR VISIT
[FreeTextEntry1] : [place] Location \par \par [facility] [place]. [phone]\par [address] [place] [address]

## 2022-02-11 NOTE — REVIEW OF SYSTEMS
[Negative] : Musculoskeletal [Vaginal Discharge] : vaginal discharge [Hematuria] : no hematuria [Dysuria] : no dysuria [Abn Vag Bleeding] : no abnormal vaginal bleeding [Dyspareunia] : no dyspareunia [Incontinence] : no incontinence

## 2022-02-11 NOTE — PHYSICAL EXAM
[Normal] : Skin and subcutaneous tissue: Normal without rashes or lesions [Abnormal] : Examination of vagina: Abnormal [de-identified] : white discharge with slight yellow tinge [de-identified] : Patient was interviewed and examined with chaperone present. Name of Chaperone: Louisa Byrd MA

## 2022-02-16 DIAGNOSIS — A49.8 OTHER BACTERIAL INFECTIONS OF UNSPECIFIED SITE: ICD-10-CM

## 2022-03-14 ENCOUNTER — APPOINTMENT (OUTPATIENT)
Dept: GYNECOLOGIC ONCOLOGY | Facility: CLINIC | Age: 42
End: 2022-03-14

## 2022-06-17 ENCOUNTER — APPOINTMENT (OUTPATIENT)
Dept: GYNECOLOGIC ONCOLOGY | Facility: CLINIC | Age: 42
End: 2022-06-17
Payer: COMMERCIAL

## 2022-06-29 ENCOUNTER — APPOINTMENT (OUTPATIENT)
Dept: GYNECOLOGIC ONCOLOGY | Facility: CLINIC | Age: 42
End: 2022-06-29
Payer: COMMERCIAL

## 2022-06-29 ENCOUNTER — NON-APPOINTMENT (OUTPATIENT)
Age: 42
End: 2022-06-29

## 2022-06-29 VITALS
WEIGHT: 138 LBS | BODY MASS INDEX: 22.18 KG/M2 | OXYGEN SATURATION: 100 % | DIASTOLIC BLOOD PRESSURE: 64 MMHG | SYSTOLIC BLOOD PRESSURE: 99 MMHG | RESPIRATION RATE: 16 BRPM | HEIGHT: 66 IN | HEART RATE: 81 BPM

## 2022-06-29 DIAGNOSIS — D05.12 INTRADUCTAL CARCINOMA IN SITU OF LEFT BREAST: ICD-10-CM

## 2022-06-29 DIAGNOSIS — Z15.01 GENETIC SUSCEPTIBILITY TO MALIGNANT NEOPLASM OF BREAST: ICD-10-CM

## 2022-06-29 DIAGNOSIS — Z15.09 GENETIC SUSCEPTIBILITY TO MALIGNANT NEOPLASM OF BREAST: ICD-10-CM

## 2022-06-29 DIAGNOSIS — Z15.02 GENETIC SUSCEPTIBILITY TO MALIGNANT NEOPLASM OF BREAST: ICD-10-CM

## 2022-06-29 PROCEDURE — 99213 OFFICE O/P EST LOW 20 MIN: CPT

## 2022-06-29 RX ORDER — METRONIDAZOLE 500 MG/1
500 TABLET ORAL TWICE DAILY
Qty: 14 | Refills: 0 | Status: DISCONTINUED | COMMUNITY
Start: 2022-02-16 | End: 2022-06-29

## 2022-06-29 RX ORDER — METRONIDAZOLE 7.5 MG/G
0.75 GEL VAGINAL
Qty: 1 | Refills: 0 | Status: DISCONTINUED | COMMUNITY
Start: 2021-12-30 | End: 2022-06-29

## 2022-06-29 NOTE — PHYSICAL EXAM
[Absent] : Adnexa(ae): Absent [Normal] : Parametria: Normal [FreeTextEntry1] : Karen Pride Medical assistant chaperoned during gynecologic exam.

## 2022-06-29 NOTE — HISTORY OF PRESENT ILLNESS
[FreeTextEntry1] : 41 y/o female w/ BRCA 2 mutation, hx of DCIS of the breast ER, [place] +, s/p bilateral mastectomy and prophylactic BSO on 8/6/2020. Pt is undergoing RT with Dr. Cruz for new triple positive left cancer (medial to implant) as well as chemotherapy. She complains of hot flashes, decreased libido with pain after intercourse. \par \par She is following with her breast surgeon/Dr. Arthur and heme/onc with Dr. Lyle at [facility].\par \par Lcpap 12/17/22 Negative

## 2022-06-29 NOTE — ASSESSMENT
[FreeTextEntry1] : Pt is tolerating RT to chest well overall with some minor skin irritation. \par \par As for postcoital pain, I am recommending lubricants and pelvic floor PT with [facility]\par \par Advised patient that I am moving out of [facility] to [place] and explained what her follow up care should be moving forward for patient. She should continue follow up with primary GYN. \par \par \par

## 2022-06-29 NOTE — REVIEW OF SYSTEMS
[Negative] : Musculoskeletal [Dyspareunia] : dyspareunia [Vaginal Discharge] : no vaginal discharge [Abn Vag Bleeding] : no abnormal vaginal bleeding

## 2022-06-29 NOTE — REASON FOR VISIT
[FreeTextEntry1] : [place] [facility] \par \par [facility] [place]. [phone]\par [address] [place] [address] \par \par -BRCA 2 mutation, hx of DCIS of the breast ER, NM +, s/p b/l mastectomy & prophylactic BSO on 8/6/2020.\par -Low dose Climara patch & micronized progesterone for menopausal symptoms. \par -Switched to Premarin \par -HRT discontinued due to diagnosis of stage 1 breast cancer, ER/NM/HER2 positive.\par -6 month svl visit.

## 2022-10-20 ENCOUNTER — APPOINTMENT (OUTPATIENT)
Dept: ANTEPARTUM | Facility: CLINIC | Age: 42
End: 2022-10-20

## 2022-10-20 ENCOUNTER — ASOB RESULT (OUTPATIENT)
Age: 42
End: 2022-10-20

## 2022-10-20 PROCEDURE — 76830 TRANSVAGINAL US NON-OB: CPT

## 2022-10-20 PROCEDURE — 76856 US EXAM PELVIC COMPLETE: CPT | Mod: 59

## 2022-10-24 ENCOUNTER — APPOINTMENT (OUTPATIENT)
Dept: OBGYN | Facility: CLINIC | Age: 42
End: 2022-10-24

## 2022-11-07 ENCOUNTER — TRANSCRIPTION ENCOUNTER (OUTPATIENT)
Age: 42
End: 2022-11-07

## 2022-12-05 NOTE — H&P PST ADULT - MAMMOGRAM, LAST, PROFILE
Addended by: KRISTIE CHERY on: 12/5/2022 01:38 PM     Modules accepted: Orders, SmartSet     07/2019

## 2023-06-16 ENCOUNTER — OUTPATIENT (OUTPATIENT)
Dept: OUTPATIENT SERVICES | Facility: HOSPITAL | Age: 43
LOS: 1 days | End: 2023-06-16
Payer: COMMERCIAL

## 2023-06-16 VITALS
HEIGHT: 67 IN | OXYGEN SATURATION: 100 % | HEART RATE: 68 BPM | SYSTOLIC BLOOD PRESSURE: 104 MMHG | TEMPERATURE: 98 F | DIASTOLIC BLOOD PRESSURE: 55 MMHG | WEIGHT: 147.05 LBS | RESPIRATION RATE: 16 BRPM

## 2023-06-16 DIAGNOSIS — Z90.722 ACQUIRED ABSENCE OF OVARIES, BILATERAL: Chronic | ICD-10-CM

## 2023-06-16 DIAGNOSIS — C50.919 MALIGNANT NEOPLASM OF UNSPECIFIED SITE OF UNSPECIFIED FEMALE BREAST: ICD-10-CM

## 2023-06-16 DIAGNOSIS — Z01.818 ENCOUNTER FOR OTHER PREPROCEDURAL EXAMINATION: ICD-10-CM

## 2023-06-16 DIAGNOSIS — Z98.82 BREAST IMPLANT STATUS: Chronic | ICD-10-CM

## 2023-06-16 DIAGNOSIS — Z98.891 HISTORY OF UTERINE SCAR FROM PREVIOUS SURGERY: Chronic | ICD-10-CM

## 2023-06-16 DIAGNOSIS — Z98.890 OTHER SPECIFIED POSTPROCEDURAL STATES: Chronic | ICD-10-CM

## 2023-06-16 DIAGNOSIS — Z90.13 ACQUIRED ABSENCE OF BILATERAL BREASTS AND NIPPLES: ICD-10-CM

## 2023-06-16 DIAGNOSIS — Z90.13 ACQUIRED ABSENCE OF BILATERAL BREASTS AND NIPPLES: Chronic | ICD-10-CM

## 2023-06-16 DIAGNOSIS — Z90.79 ACQUIRED ABSENCE OF OTHER GENITAL ORGAN(S): Chronic | ICD-10-CM

## 2023-06-16 LAB
ANION GAP SERPL CALC-SCNC: 5 MMOL/L — SIGNIFICANT CHANGE UP (ref 5–17)
BASOPHILS # BLD AUTO: 0.05 K/UL — SIGNIFICANT CHANGE UP (ref 0–0.2)
BASOPHILS NFR BLD AUTO: 0.9 % — SIGNIFICANT CHANGE UP (ref 0–2)
BUN SERPL-MCNC: 15 MG/DL — SIGNIFICANT CHANGE UP (ref 7–23)
CALCIUM SERPL-MCNC: 9.7 MG/DL — SIGNIFICANT CHANGE UP (ref 8.5–10.1)
CHLORIDE SERPL-SCNC: 108 MMOL/L — SIGNIFICANT CHANGE UP (ref 96–108)
CO2 SERPL-SCNC: 29 MMOL/L — SIGNIFICANT CHANGE UP (ref 22–31)
CREAT SERPL-MCNC: 0.95 MG/DL — SIGNIFICANT CHANGE UP (ref 0.5–1.3)
EGFR: 77 ML/MIN/1.73M2 — SIGNIFICANT CHANGE UP
EOSINOPHIL # BLD AUTO: 0.04 K/UL — SIGNIFICANT CHANGE UP (ref 0–0.5)
EOSINOPHIL NFR BLD AUTO: 0.7 % — SIGNIFICANT CHANGE UP (ref 0–6)
GLUCOSE SERPL-MCNC: 56 MG/DL — LOW (ref 70–99)
HCT VFR BLD CALC: 39.5 % — SIGNIFICANT CHANGE UP (ref 34.5–45)
HGB BLD-MCNC: 14 G/DL — SIGNIFICANT CHANGE UP (ref 11.5–15.5)
IMM GRANULOCYTES NFR BLD AUTO: 0.5 % — SIGNIFICANT CHANGE UP (ref 0–0.9)
LYMPHOCYTES # BLD AUTO: 0.78 K/UL — LOW (ref 1–3.3)
LYMPHOCYTES # BLD AUTO: 14 % — SIGNIFICANT CHANGE UP (ref 13–44)
MCHC RBC-ENTMCNC: 35.4 GM/DL — SIGNIFICANT CHANGE UP (ref 32–36)
MCHC RBC-ENTMCNC: 35.4 PG — HIGH (ref 27–34)
MCV RBC AUTO: 99.7 FL — SIGNIFICANT CHANGE UP (ref 80–100)
MONOCYTES # BLD AUTO: 0.38 K/UL — SIGNIFICANT CHANGE UP (ref 0–0.9)
MONOCYTES NFR BLD AUTO: 6.8 % — SIGNIFICANT CHANGE UP (ref 2–14)
MRSA PCR RESULT.: SIGNIFICANT CHANGE UP
NEUTROPHILS # BLD AUTO: 4.31 K/UL — SIGNIFICANT CHANGE UP (ref 1.8–7.4)
NEUTROPHILS NFR BLD AUTO: 77.1 % — HIGH (ref 43–77)
PLATELET # BLD AUTO: 280 K/UL — SIGNIFICANT CHANGE UP (ref 150–400)
POTASSIUM SERPL-MCNC: 3.9 MMOL/L — SIGNIFICANT CHANGE UP (ref 3.5–5.3)
POTASSIUM SERPL-SCNC: 3.9 MMOL/L — SIGNIFICANT CHANGE UP (ref 3.5–5.3)
RBC # BLD: 3.96 M/UL — SIGNIFICANT CHANGE UP (ref 3.8–5.2)
RBC # FLD: 13.7 % — SIGNIFICANT CHANGE UP (ref 10.3–14.5)
S AUREUS DNA NOSE QL NAA+PROBE: SIGNIFICANT CHANGE UP
SODIUM SERPL-SCNC: 142 MMOL/L — SIGNIFICANT CHANGE UP (ref 135–145)
WBC # BLD: 5.59 K/UL — SIGNIFICANT CHANGE UP (ref 3.8–10.5)
WBC # FLD AUTO: 5.59 K/UL — SIGNIFICANT CHANGE UP (ref 3.8–10.5)

## 2023-06-16 PROCEDURE — 87641 MR-STAPH DNA AMP PROBE: CPT

## 2023-06-16 PROCEDURE — 93010 ELECTROCARDIOGRAM REPORT: CPT

## 2023-06-16 PROCEDURE — 99214 OFFICE O/P EST MOD 30 MIN: CPT | Mod: 25

## 2023-06-16 PROCEDURE — 85025 COMPLETE CBC W/AUTO DIFF WBC: CPT

## 2023-06-16 PROCEDURE — 36415 COLL VENOUS BLD VENIPUNCTURE: CPT

## 2023-06-16 PROCEDURE — 80048 BASIC METABOLIC PNL TOTAL CA: CPT

## 2023-06-16 PROCEDURE — 93005 ELECTROCARDIOGRAM TRACING: CPT

## 2023-06-16 PROCEDURE — 87640 STAPH A DNA AMP PROBE: CPT

## 2023-06-16 NOTE — H&P PST ADULT - NSICDXPASTSURGICALHX_GEN_ALL_CORE_FT
PAST SURGICAL HISTORY:  H/O bilateral oophorectomy     H/O bilateral salpingectomy     H/O  section     H/O rhinoplasty     S/P abdominoplasty     S/P bilateral mastectomy with reconstruction 2020    S/P breast augmentation

## 2023-06-16 NOTE — H&P PST ADULT - HEMATOLOGY/LYMPHATICS COMMENTS
Denies personal hx of DVT or PE , family hx o fanticardiolipin syndrome mother, extensive family hx of DVT / PE

## 2023-06-16 NOTE — H&P PST ADULT - ASSESSMENT
89 She presents to PST for scheduled bilateral breast reconstruction and chest port removal on 6/23/23.

## 2023-06-16 NOTE — H&P PST ADULT - HISTORY OF PRESENT ILLNESS
44 y/o female with history of left DCIS s/p , BRCA 2 positive, narcolepsy 44 y/o female with history of left DCIS s/p mastectomy and reconstruction (2020), BRCA 2 positive, breast cancer recurrence on mammogram 2021) s/p chemo and radiation 1/2023 and narcolepsy. She presents to PST for scheduled bilateral breast reconstruction and chest port removal on 6/23/23.

## 2023-06-16 NOTE — H&P PST ADULT - PROBLEM SELECTOR PLAN 1
Pre op, mupirocin and chlorhexidine instructions given and explained.  Avoid NSAIDs and OTC supplements.   Patient verbalized understanding  continue letrozole, Lynparza and oxybutynin on the DOS   cbc, bmp, mrsa, done today in PST

## 2023-06-16 NOTE — H&P PST ADULT - NSICDXFAMILYHX_GEN_ALL_CORE_FT
FAMILY HISTORY:  Family history of anticardiolipin syndrome, mother  Family history of pancreatic cancer, father  Family history of rheumatic fever, mother

## 2023-06-16 NOTE — H&P PST ADULT - NSANTHOSAYNRD_GEN_A_CORE
No. TUNED screening performed.  STOP BANG Legend: 0-2 = LOW Risk; 3-4 = INTERMEDIATE Risk; 5-8 = HIGH Risk

## 2023-06-17 DIAGNOSIS — Z90.13 ACQUIRED ABSENCE OF BILATERAL BREASTS AND NIPPLES: ICD-10-CM

## 2023-06-17 DIAGNOSIS — Z01.818 ENCOUNTER FOR OTHER PREPROCEDURAL EXAMINATION: ICD-10-CM

## 2023-06-23 ENCOUNTER — TRANSCRIPTION ENCOUNTER (OUTPATIENT)
Age: 43
End: 2023-06-23

## 2023-06-23 ENCOUNTER — OUTPATIENT (OUTPATIENT)
Dept: INPATIENT UNIT | Facility: HOSPITAL | Age: 43
LOS: 1 days | Discharge: HOME | End: 2023-06-23
Payer: COMMERCIAL

## 2023-06-23 VITALS
RESPIRATION RATE: 14 BRPM | DIASTOLIC BLOOD PRESSURE: 67 MMHG | HEART RATE: 68 BPM | OXYGEN SATURATION: 100 % | SYSTOLIC BLOOD PRESSURE: 100 MMHG | TEMPERATURE: 98 F

## 2023-06-23 VITALS
OXYGEN SATURATION: 100 % | SYSTOLIC BLOOD PRESSURE: 110 MMHG | DIASTOLIC BLOOD PRESSURE: 67 MMHG | TEMPERATURE: 98 F | RESPIRATION RATE: 16 BRPM | HEART RATE: 60 BPM | WEIGHT: 134.92 LBS

## 2023-06-23 DIAGNOSIS — Z85.3 PERSONAL HISTORY OF MALIGNANT NEOPLASM OF BREAST: ICD-10-CM

## 2023-06-23 DIAGNOSIS — Z98.891 HISTORY OF UTERINE SCAR FROM PREVIOUS SURGERY: Chronic | ICD-10-CM

## 2023-06-23 DIAGNOSIS — Z90.13 ACQUIRED ABSENCE OF BILATERAL BREASTS AND NIPPLES: ICD-10-CM

## 2023-06-23 DIAGNOSIS — Z90.722 ACQUIRED ABSENCE OF OVARIES, BILATERAL: ICD-10-CM

## 2023-06-23 DIAGNOSIS — Z92.21 PERSONAL HISTORY OF ANTINEOPLASTIC CHEMOTHERAPY: ICD-10-CM

## 2023-06-23 DIAGNOSIS — Z90.79 ACQUIRED ABSENCE OF OTHER GENITAL ORGAN(S): ICD-10-CM

## 2023-06-23 DIAGNOSIS — Z98.890 OTHER SPECIFIED POSTPROCEDURAL STATES: Chronic | ICD-10-CM

## 2023-06-23 DIAGNOSIS — Z90.79 ACQUIRED ABSENCE OF OTHER GENITAL ORGAN(S): Chronic | ICD-10-CM

## 2023-06-23 DIAGNOSIS — Z15.01 GENETIC SUSCEPTIBILITY TO MALIGNANT NEOPLASM OF BREAST: ICD-10-CM

## 2023-06-23 DIAGNOSIS — Z90.722 ACQUIRED ABSENCE OF OVARIES, BILATERAL: Chronic | ICD-10-CM

## 2023-06-23 DIAGNOSIS — N65.0 DEFORMITY OF RECONSTRUCTED BREAST: ICD-10-CM

## 2023-06-23 DIAGNOSIS — L90.5 SCAR CONDITIONS AND FIBROSIS OF SKIN: ICD-10-CM

## 2023-06-23 DIAGNOSIS — Z98.82 BREAST IMPLANT STATUS: Chronic | ICD-10-CM

## 2023-06-23 DIAGNOSIS — Z90.13 ACQUIRED ABSENCE OF BILATERAL BREASTS AND NIPPLES: Chronic | ICD-10-CM

## 2023-06-23 DIAGNOSIS — Z92.3 PERSONAL HISTORY OF IRRADIATION: ICD-10-CM

## 2023-06-23 DIAGNOSIS — G47.419 NARCOLEPSY WITHOUT CATAPLEXY: ICD-10-CM

## 2023-06-23 LAB
BLD GP AB SCN SERPL QL: SIGNIFICANT CHANGE UP
HCG UR QL: NEGATIVE — SIGNIFICANT CHANGE UP

## 2023-06-23 PROCEDURE — 88300 SURGICAL PATH GROSS: CPT | Mod: 26,59

## 2023-06-23 PROCEDURE — 88304 TISSUE EXAM BY PATHOLOGIST: CPT

## 2023-06-23 PROCEDURE — 36415 COLL VENOUS BLD VENIPUNCTURE: CPT

## 2023-06-23 PROCEDURE — 86901 BLOOD TYPING SEROLOGIC RH(D): CPT

## 2023-06-23 PROCEDURE — C1789: CPT

## 2023-06-23 PROCEDURE — 88304 TISSUE EXAM BY PATHOLOGIST: CPT | Mod: 26

## 2023-06-23 PROCEDURE — 81025 URINE PREGNANCY TEST: CPT

## 2023-06-23 PROCEDURE — 86900 BLOOD TYPING SEROLOGIC ABO: CPT

## 2023-06-23 PROCEDURE — 86850 RBC ANTIBODY SCREEN: CPT

## 2023-06-23 PROCEDURE — 88300 SURGICAL PATH GROSS: CPT

## 2023-06-23 RX ORDER — BACILLUS COAGULANS/INULIN 1B-250 MG
1 CAPSULE ORAL
Refills: 0 | DISCHARGE

## 2023-06-23 RX ORDER — SODIUM CHLORIDE 9 MG/ML
1000 INJECTION, SOLUTION INTRAVENOUS
Refills: 0 | Status: DISCONTINUED | OUTPATIENT
Start: 2023-06-23 | End: 2023-06-23

## 2023-06-23 RX ORDER — FENTANYL CITRATE 50 UG/ML
25 INJECTION INTRAVENOUS
Refills: 0 | Status: DISCONTINUED | OUTPATIENT
Start: 2023-06-23 | End: 2023-06-23

## 2023-06-23 RX ORDER — OLAPARIB 150 MG/1
2 TABLET, FILM COATED ORAL
Refills: 0 | DISCHARGE

## 2023-06-23 RX ORDER — OXYCODONE HYDROCHLORIDE 5 MG/1
5 TABLET ORAL EVERY 4 HOURS
Refills: 0 | Status: DISCONTINUED | OUTPATIENT
Start: 2023-06-23 | End: 2023-06-23

## 2023-06-23 RX ORDER — ONDANSETRON 8 MG/1
4 TABLET, FILM COATED ORAL ONCE
Refills: 0 | Status: DISCONTINUED | OUTPATIENT
Start: 2023-06-23 | End: 2023-06-23

## 2023-06-23 RX ORDER — FENTANYL CITRATE 50 UG/ML
50 INJECTION INTRAVENOUS
Refills: 0 | Status: DISCONTINUED | OUTPATIENT
Start: 2023-06-23 | End: 2023-06-23

## 2023-06-23 RX ORDER — OXYCODONE HYDROCHLORIDE 5 MG/1
5 TABLET ORAL ONCE
Refills: 0 | Status: DISCONTINUED | OUTPATIENT
Start: 2023-06-23 | End: 2023-06-23

## 2023-06-23 RX ORDER — OXYBUTYNIN CHLORIDE 5 MG
1 TABLET ORAL
Refills: 0 | DISCHARGE

## 2023-06-23 RX ORDER — DEXTROAMPHETAMINE SACCHARATE, AMPHETAMINE ASPARTATE, DEXTROAMPHETAMINE SULFATE AND AMPHETAMINE SULFATE 1.875; 1.875; 1.875; 1.875 MG/1; MG/1; MG/1; MG/1
1 TABLET ORAL
Refills: 0 | DISCHARGE

## 2023-06-23 RX ORDER — LETROZOLE 2.5 MG/1
1 TABLET, FILM COATED ORAL
Refills: 0 | DISCHARGE

## 2023-06-23 RX ADMIN — FENTANYL CITRATE 50 MICROGRAM(S): 50 INJECTION INTRAVENOUS at 12:56

## 2023-06-23 RX ADMIN — FENTANYL CITRATE 50 MICROGRAM(S): 50 INJECTION INTRAVENOUS at 12:39

## 2023-06-23 RX ADMIN — OXYCODONE HYDROCHLORIDE 5 MILLIGRAM(S): 5 TABLET ORAL at 14:28

## 2023-06-23 RX ADMIN — OXYCODONE HYDROCHLORIDE 5 MILLIGRAM(S): 5 TABLET ORAL at 13:55

## 2023-06-23 RX ADMIN — OXYCODONE HYDROCHLORIDE 5 MILLIGRAM(S): 5 TABLET ORAL at 13:15

## 2023-06-23 RX ADMIN — OXYCODONE HYDROCHLORIDE 5 MILLIGRAM(S): 5 TABLET ORAL at 12:57

## 2023-06-23 RX ADMIN — FENTANYL CITRATE 50 MICROGRAM(S): 50 INJECTION INTRAVENOUS at 12:57

## 2023-06-23 NOTE — ASU PATIENT PROFILE, ADULT - FALL HARM RISK - UNIVERSAL INTERVENTIONS
Bed in lowest position, wheels locked, appropriate side rails in place/Call bell, personal items and telephone in reach/Instruct patient to call for assistance before getting out of bed or chair/Non-slip footwear when patient is out of bed/[place] to call system/Physically safe environment - no spills, clutter or unnecessary equipment/Purposeful Proactive Rounding/Room/bathroom lighting operational, light cord in reach

## 2023-06-23 NOTE — ASU DISCHARGE PLAN (ADULT/PEDIATRIC) - CARE PROVIDER_API CALL
Billy Flanagan  Plastic Surgery  [address], [address]  [place] [address]  Phone: [phone]  Fax: [phone]  Follow Up Time: 1 week

## 2023-06-23 NOTE — ASU DISCHARGE PLAN (ADULT/PEDIATRIC) - NS MD DC FALL RISK RISK
For information on Fall & Injury Prevention, visit: [email or web address] OR  [email or web address] OR  https://www.cdc.gov/steadi/patient.html

## 2023-06-27 LAB — SURGICAL PATHOLOGY STUDY: SIGNIFICANT CHANGE UP
